# Patient Record
Sex: FEMALE | Race: WHITE | Employment: FULL TIME | URBAN - NONMETROPOLITAN AREA
[De-identification: names, ages, dates, MRNs, and addresses within clinical notes are randomized per-mention and may not be internally consistent; named-entity substitution may affect disease eponyms.]

---

## 2017-02-14 ENCOUNTER — APPOINTMENT (OUTPATIENT)
Dept: CT IMAGING | Age: 47
End: 2017-02-14
Payer: COMMERCIAL

## 2017-02-14 ENCOUNTER — HOSPITAL ENCOUNTER (EMERGENCY)
Age: 47
Discharge: HOME | End: 2017-02-14
Attending: EMERGENCY MEDICINE
Payer: COMMERCIAL

## 2017-02-14 VITALS
SYSTOLIC BLOOD PRESSURE: 123 MMHG | RESPIRATION RATE: 14 BRPM | HEART RATE: 83 BPM | DIASTOLIC BLOOD PRESSURE: 76 MMHG | OXYGEN SATURATION: 95 % | TEMPERATURE: 97.9 F

## 2017-02-14 DIAGNOSIS — R51.9 FACIAL PAIN, ACUTE: Primary | ICD-10-CM

## 2017-02-14 LAB
ABSOLUTE EOS #: 0.2 K/UL (ref 0–0.4)
ABSOLUTE LYMPH #: 3.6 K/UL (ref 1–4.8)
ABSOLUTE MONO #: 0.7 K/UL (ref 0.1–1.2)
ANION GAP SERPL CALCULATED.3IONS-SCNC: 14 MMOL/L (ref 9–17)
BASOPHILS # BLD: 1 % (ref 0–2)
BASOPHILS ABSOLUTE: 0.1 K/UL (ref 0–0.2)
BUN BLDV-MCNC: 7 MG/DL (ref 6–20)
BUN/CREAT BLD: 10 (ref 9–20)
C-REACTIVE PROTEIN: 8.7 MG/L (ref 0–5)
CALCIUM SERPL-MCNC: 9.5 MG/DL (ref 8.6–10.4)
CHLORIDE BLD-SCNC: 102 MMOL/L (ref 98–107)
CO2: 26 MMOL/L (ref 20–31)
CREAT SERPL-MCNC: 0.68 MG/DL (ref 0.5–0.9)
DIFFERENTIAL TYPE: ABNORMAL
EOSINOPHILS RELATIVE PERCENT: 2 % (ref 1–4)
GFR AFRICAN AMERICAN: >60 ML/MIN
GFR NON-AFRICAN AMERICAN: >60 ML/MIN
GFR SERPL CREATININE-BSD FRML MDRD: ABNORMAL ML/MIN/{1.73_M2}
GFR SERPL CREATININE-BSD FRML MDRD: ABNORMAL ML/MIN/{1.73_M2}
GLUCOSE BLD-MCNC: 88 MG/DL (ref 70–99)
HCT VFR BLD CALC: 42.9 % (ref 36–46)
HEMOGLOBIN: 14 G/DL (ref 12–16)
LYMPHOCYTES # BLD: 32 % (ref 24–44)
MCH RBC QN AUTO: 29.4 PG (ref 26–34)
MCHC RBC AUTO-ENTMCNC: 32.7 G/DL (ref 31–37)
MCV RBC AUTO: 89.8 FL (ref 80–100)
MONOCYTES # BLD: 6 % (ref 1–7)
PDW BLD-RTO: 14.4 % (ref 11–14.5)
PLATELET # BLD: 211 K/UL (ref 140–450)
PLATELET ESTIMATE: ABNORMAL
PMV BLD AUTO: 9.8 FL (ref 6–12)
POTASSIUM SERPL-SCNC: 3.6 MMOL/L (ref 3.7–5.3)
RBC # BLD: 4.77 M/UL (ref 4–5.2)
RBC # BLD: ABNORMAL 10*6/UL
SEDIMENTATION RATE, ERYTHROCYTE: 29 MM (ref 0–20)
SEG NEUTROPHILS: 59 % (ref 36–66)
SEGMENTED NEUTROPHILS ABSOLUTE COUNT: 6.7 K/UL (ref 1.8–7.7)
SODIUM BLD-SCNC: 142 MMOL/L (ref 135–144)
WBC # BLD: 11.3 K/UL (ref 3.5–11)
WBC # BLD: ABNORMAL 10*3/UL

## 2017-02-14 PROCEDURE — 85651 RBC SED RATE NONAUTOMATED: CPT

## 2017-02-14 PROCEDURE — 6360000002 HC RX W HCPCS: Performed by: EMERGENCY MEDICINE

## 2017-02-14 PROCEDURE — 36415 COLL VENOUS BLD VENIPUNCTURE: CPT

## 2017-02-14 PROCEDURE — 80048 BASIC METABOLIC PNL TOTAL CA: CPT

## 2017-02-14 PROCEDURE — 85025 COMPLETE CBC W/AUTO DIFF WBC: CPT

## 2017-02-14 PROCEDURE — 70450 CT HEAD/BRAIN W/O DYE: CPT

## 2017-02-14 PROCEDURE — 99283 EMERGENCY DEPT VISIT LOW MDM: CPT

## 2017-02-14 PROCEDURE — 96375 TX/PRO/DX INJ NEW DRUG ADDON: CPT

## 2017-02-14 PROCEDURE — 96374 THER/PROPH/DIAG INJ IV PUSH: CPT

## 2017-02-14 PROCEDURE — 86140 C-REACTIVE PROTEIN: CPT

## 2017-02-14 PROCEDURE — 70450 CT HEAD/BRAIN W/O DYE: CPT | Performed by: RADIOLOGY

## 2017-02-14 RX ORDER — ONDANSETRON 2 MG/ML
4 INJECTION INTRAMUSCULAR; INTRAVENOUS ONCE
Status: COMPLETED | OUTPATIENT
Start: 2017-02-14 | End: 2017-02-14

## 2017-02-14 RX ORDER — FENTANYL CITRATE 50 UG/ML
50 INJECTION, SOLUTION INTRAMUSCULAR; INTRAVENOUS ONCE
Status: COMPLETED | OUTPATIENT
Start: 2017-02-14 | End: 2017-02-14

## 2017-02-14 RX ORDER — KETOROLAC TROMETHAMINE 30 MG/ML
30 INJECTION, SOLUTION INTRAMUSCULAR; INTRAVENOUS ONCE
Status: COMPLETED | OUTPATIENT
Start: 2017-02-14 | End: 2017-02-14

## 2017-02-14 RX ADMIN — KETOROLAC TROMETHAMINE 30 MG: 30 INJECTION, SOLUTION INTRAMUSCULAR at 19:13

## 2017-02-14 RX ADMIN — FENTANYL CITRATE 50 MCG: 50 INJECTION INTRAMUSCULAR; INTRAVENOUS at 19:13

## 2017-02-14 RX ADMIN — ONDANSETRON 4 MG: 2 INJECTION INTRAMUSCULAR; INTRAVENOUS at 19:13

## 2017-02-14 ASSESSMENT — ENCOUNTER SYMPTOMS
NAUSEA: 0
SHORTNESS OF BREATH: 0
CONSTIPATION: 0
SINUS PRESSURE: 1
BLOOD IN STOOL: 0
COUGH: 0
DIARRHEA: 0
ABDOMINAL PAIN: 0
BACK PAIN: 0
VOMITING: 0
EYE PAIN: 0

## 2017-02-14 ASSESSMENT — PAIN DESCRIPTION - ORIENTATION: ORIENTATION: RIGHT

## 2017-02-14 ASSESSMENT — PAIN DESCRIPTION - FREQUENCY: FREQUENCY: CONTINUOUS

## 2017-02-14 ASSESSMENT — PAIN DESCRIPTION - LOCATION: LOCATION: FACE

## 2017-02-14 ASSESSMENT — PAIN DESCRIPTION - PROGRESSION: CLINICAL_PROGRESSION: NOT CHANGED

## 2017-02-14 ASSESSMENT — PAIN SCALES - GENERAL
PAINLEVEL_OUTOF10: 10
PAINLEVEL_OUTOF10: 8
PAINLEVEL_OUTOF10: 10

## 2017-02-14 ASSESSMENT — PAIN DESCRIPTION - PAIN TYPE
TYPE: ACUTE PAIN
TYPE: ACUTE PAIN

## 2017-02-14 ASSESSMENT — PAIN DESCRIPTION - ONSET: ONSET: ON-GOING

## 2017-02-14 ASSESSMENT — PAIN DESCRIPTION - DESCRIPTORS: DESCRIPTORS: ACHING;SHOOTING;SHARP;STABBING

## 2017-07-04 ENCOUNTER — HOSPITAL ENCOUNTER (EMERGENCY)
Age: 47
Discharge: HOME | End: 2017-07-04
Attending: EMERGENCY MEDICINE
Payer: COMMERCIAL

## 2017-07-04 VITALS
HEART RATE: 70 BPM | BODY MASS INDEX: 32.49 KG/M2 | OXYGEN SATURATION: 100 % | HEIGHT: 65 IN | DIASTOLIC BLOOD PRESSURE: 83 MMHG | WEIGHT: 195 LBS | RESPIRATION RATE: 14 BRPM | TEMPERATURE: 97.2 F | SYSTOLIC BLOOD PRESSURE: 148 MMHG

## 2017-07-04 DIAGNOSIS — T78.40XA ALLERGIC REACTION, INITIAL ENCOUNTER: Primary | ICD-10-CM

## 2017-07-04 PROCEDURE — 96372 THER/PROPH/DIAG INJ SC/IM: CPT

## 2017-07-04 PROCEDURE — 99283 EMERGENCY DEPT VISIT LOW MDM: CPT

## 2017-07-04 PROCEDURE — 6360000002 HC RX W HCPCS: Performed by: EMERGENCY MEDICINE

## 2017-07-04 PROCEDURE — 6370000000 HC RX 637 (ALT 250 FOR IP): Performed by: EMERGENCY MEDICINE

## 2017-07-04 RX ORDER — DEXAMETHASONE SODIUM PHOSPHATE 4 MG/ML
4 INJECTION, SOLUTION INTRA-ARTICULAR; INTRALESIONAL; INTRAMUSCULAR; INTRAVENOUS; SOFT TISSUE ONCE
Status: COMPLETED | OUTPATIENT
Start: 2017-07-04 | End: 2017-07-04

## 2017-07-04 RX ORDER — FAMOTIDINE 20 MG/1
20 TABLET, FILM COATED ORAL ONCE
Status: COMPLETED | OUTPATIENT
Start: 2017-07-04 | End: 2017-07-04

## 2017-07-04 RX ADMIN — FAMOTIDINE 20 MG: 20 TABLET ORAL at 02:14

## 2017-07-04 RX ADMIN — DEXAMETHASONE SODIUM PHOSPHATE 4 MG: 4 INJECTION, SOLUTION INTRAMUSCULAR; INTRAVENOUS at 02:14

## 2017-07-04 ASSESSMENT — PAIN DESCRIPTION - FREQUENCY: FREQUENCY: CONTINUOUS

## 2017-07-04 ASSESSMENT — PAIN DESCRIPTION - DESCRIPTORS: DESCRIPTORS: STABBING

## 2017-07-04 ASSESSMENT — PAIN DESCRIPTION - LOCATION: LOCATION: ARM

## 2017-07-04 ASSESSMENT — PAIN DESCRIPTION - PAIN TYPE: TYPE: ACUTE PAIN

## 2017-07-04 ASSESSMENT — PAIN DESCRIPTION - ORIENTATION: ORIENTATION: LEFT

## 2017-07-04 ASSESSMENT — PAIN SCALES - GENERAL: PAINLEVEL_OUTOF10: 8

## 2017-09-06 ENCOUNTER — OFFICE VISIT (OUTPATIENT)
Dept: PRIMARY CARE CLINIC | Age: 47
End: 2017-09-06
Payer: COMMERCIAL

## 2017-09-06 VITALS
SYSTOLIC BLOOD PRESSURE: 124 MMHG | WEIGHT: 179 LBS | BODY MASS INDEX: 29.82 KG/M2 | HEIGHT: 65 IN | TEMPERATURE: 97.5 F | DIASTOLIC BLOOD PRESSURE: 70 MMHG | HEART RATE: 62 BPM | OXYGEN SATURATION: 97 %

## 2017-09-06 DIAGNOSIS — R51.9 ACUTE NONINTRACTABLE HEADACHE, UNSPECIFIED HEADACHE TYPE: ICD-10-CM

## 2017-09-06 DIAGNOSIS — R11.2 NON-INTRACTABLE VOMITING WITH NAUSEA, UNSPECIFIED VOMITING TYPE: Primary | ICD-10-CM

## 2017-09-06 PROCEDURE — 99214 OFFICE O/P EST MOD 30 MIN: CPT | Performed by: FAMILY MEDICINE

## 2017-09-06 RX ORDER — PROMETHAZINE HYDROCHLORIDE 25 MG/1
25 TABLET ORAL EVERY 6 HOURS PRN
Qty: 15 TABLET | Refills: 0 | Status: SHIPPED | OUTPATIENT
Start: 2017-09-06 | End: 2017-09-13

## 2017-09-06 RX ORDER — MELOXICAM 15 MG/1
15 TABLET ORAL DAILY
Qty: 15 TABLET | Refills: 0 | Status: SHIPPED | OUTPATIENT
Start: 2017-09-06 | End: 2019-05-02

## 2017-10-23 ENCOUNTER — TELEPHONE (OUTPATIENT)
Dept: PRIMARY CARE CLINIC | Age: 47
End: 2017-10-23

## 2017-12-07 ENCOUNTER — TELEPHONE (OUTPATIENT)
Dept: PRIMARY CARE CLINIC | Age: 47
End: 2017-12-07

## 2017-12-07 NOTE — LETTER
[facility]  Westley Jamil  Phone: [phone]  Fax: [phone]    Stefan Ya MD        December 7, 2017     Joy Stanford  [address] [place] [address]      Dear Mitali Peñaloza: This letter is a reminder that your Basic Metabolic Profile and CBC tests are due. Please call our office to schedule an appointment. If you've already underwent or scheduled these procedures, please disregard this notice.     Sincerely,        Stfean Ya MD

## 2018-01-15 ENCOUNTER — TELEPHONE (OUTPATIENT)
Dept: PRIMARY CARE CLINIC | Age: 48
End: 2018-01-15

## 2018-04-08 ENCOUNTER — HOSPITAL ENCOUNTER (EMERGENCY)
Age: 48
Discharge: HOME | End: 2018-04-08
Attending: EMERGENCY MEDICINE
Payer: COMMERCIAL

## 2018-04-08 VITALS
HEART RATE: 68 BPM | RESPIRATION RATE: 12 BRPM | DIASTOLIC BLOOD PRESSURE: 81 MMHG | SYSTOLIC BLOOD PRESSURE: 144 MMHG | TEMPERATURE: 97.2 F | OXYGEN SATURATION: 98 %

## 2018-04-08 DIAGNOSIS — L50.9 URTICARIA: Primary | ICD-10-CM

## 2018-04-08 PROCEDURE — 96374 THER/PROPH/DIAG INJ IV PUSH: CPT

## 2018-04-08 PROCEDURE — 2500000003 HC RX 250 WO HCPCS: Performed by: EMERGENCY MEDICINE

## 2018-04-08 PROCEDURE — S0028 INJECTION, FAMOTIDINE, 20 MG: HCPCS | Performed by: EMERGENCY MEDICINE

## 2018-04-08 PROCEDURE — 6360000002 HC RX W HCPCS: Performed by: EMERGENCY MEDICINE

## 2018-04-08 PROCEDURE — 96375 TX/PRO/DX INJ NEW DRUG ADDON: CPT

## 2018-04-08 PROCEDURE — 99282 EMERGENCY DEPT VISIT SF MDM: CPT

## 2018-04-08 RX ORDER — FAMOTIDINE 20 MG/1
20 TABLET, FILM COATED ORAL 2 TIMES DAILY
Qty: 10 TABLET | Refills: 0 | Status: SHIPPED | OUTPATIENT
Start: 2018-04-08 | End: 2019-05-02

## 2018-04-08 RX ORDER — METHYLPREDNISOLONE SODIUM SUCCINATE 125 MG/2ML
125 INJECTION, POWDER, LYOPHILIZED, FOR SOLUTION INTRAMUSCULAR; INTRAVENOUS ONCE
Status: COMPLETED | OUTPATIENT
Start: 2018-04-08 | End: 2018-04-08

## 2018-04-08 RX ORDER — PREDNISONE 50 MG/1
50 TABLET ORAL DAILY
Qty: 5 TABLET | Refills: 0 | Status: SHIPPED | OUTPATIENT
Start: 2018-04-08 | End: 2020-03-11

## 2018-04-08 RX ORDER — LORATADINE 10 MG/1
10 TABLET ORAL DAILY
Qty: 30 TABLET | Refills: 0 | Status: SHIPPED | OUTPATIENT
Start: 2018-04-08 | End: 2019-05-02

## 2018-04-08 RX ADMIN — FAMOTIDINE 20 MG: 10 INJECTION, SOLUTION INTRAVENOUS at 14:34

## 2018-04-08 RX ADMIN — METHYLPREDNISOLONE SODIUM SUCCINATE 125 MG: 125 INJECTION, POWDER, FOR SOLUTION INTRAMUSCULAR; INTRAVENOUS at 14:31

## 2018-04-08 ASSESSMENT — PAIN SCALES - GENERAL: PAINLEVEL_OUTOF10: 4

## 2018-04-08 ASSESSMENT — PAIN DESCRIPTION - PAIN TYPE: TYPE: ACUTE PAIN

## 2018-04-08 ASSESSMENT — PAIN DESCRIPTION - PROGRESSION: CLINICAL_PROGRESSION: GRADUALLY WORSENING

## 2018-04-08 ASSESSMENT — PAIN DESCRIPTION - LOCATION: LOCATION: ARM

## 2018-04-08 ASSESSMENT — PAIN SCALES - WONG BAKER: WONGBAKER_NUMERICALRESPONSE: 0

## 2018-04-08 ASSESSMENT — PAIN DESCRIPTION - ONSET: ONSET: ON-GOING

## 2018-04-08 ASSESSMENT — PAIN DESCRIPTION - FREQUENCY: FREQUENCY: CONTINUOUS

## 2018-04-08 ASSESSMENT — PAIN DESCRIPTION - ORIENTATION: ORIENTATION: LEFT

## 2018-04-15 ENCOUNTER — APPOINTMENT (OUTPATIENT)
Dept: CT IMAGING | Age: 48
End: 2018-04-15
Payer: COMMERCIAL

## 2018-04-15 ENCOUNTER — HOSPITAL ENCOUNTER (EMERGENCY)
Age: 48
Discharge: HOME | End: 2018-04-15
Attending: EMERGENCY MEDICINE
Payer: COMMERCIAL

## 2018-04-15 VITALS
DIASTOLIC BLOOD PRESSURE: 79 MMHG | HEART RATE: 68 BPM | TEMPERATURE: 99.1 F | SYSTOLIC BLOOD PRESSURE: 140 MMHG | RESPIRATION RATE: 12 BRPM | OXYGEN SATURATION: 98 %

## 2018-04-15 DIAGNOSIS — N10 ACUTE PYELONEPHRITIS: Primary | ICD-10-CM

## 2018-04-15 LAB
-: ABNORMAL
ABSOLUTE EOS #: 0.8 K/UL (ref 0–0.4)
ABSOLUTE IMMATURE GRANULOCYTE: ABNORMAL K/UL (ref 0–0.3)
ABSOLUTE LYMPH #: 6.36 K/UL (ref 1–4.8)
ABSOLUTE MONO #: 1.75 K/UL (ref 0.1–1.2)
ALBUMIN SERPL-MCNC: 3.7 G/DL (ref 3.5–5.2)
ALBUMIN/GLOBULIN RATIO: 1.2 (ref 1–2.5)
ALP BLD-CCNC: 91 U/L (ref 35–104)
ALT SERPL-CCNC: 18 U/L (ref 5–33)
AMORPHOUS: ABNORMAL
AMYLASE: 41 U/L (ref 28–100)
ANION GAP SERPL CALCULATED.3IONS-SCNC: 13 MMOL/L (ref 9–17)
AST SERPL-CCNC: 10 U/L
BACTERIA: ABNORMAL
BASOPHILS # BLD: 0 % (ref 0–1)
BASOPHILS ABSOLUTE: 0 K/UL (ref 0–0.2)
BILIRUB SERPL-MCNC: 0.2 MG/DL (ref 0.3–1.2)
BILIRUBIN DIRECT: <0.08 MG/DL
BILIRUBIN URINE: NEGATIVE
BILIRUBIN, INDIRECT: ABNORMAL MG/DL (ref 0–1)
BUN BLDV-MCNC: 7 MG/DL (ref 6–20)
BUN/CREAT BLD: 12 (ref 9–20)
CALCIUM SERPL-MCNC: 8.9 MG/DL (ref 8.6–10.4)
CASTS UA: ABNORMAL /LPF (ref 0–2)
CHLORIDE BLD-SCNC: 102 MMOL/L (ref 98–107)
CO2: 26 MMOL/L (ref 20–31)
COLOR: ABNORMAL
COMMENT UA: ABNORMAL
CREAT SERPL-MCNC: 0.59 MG/DL (ref 0.5–0.9)
CRYSTALS, UA: ABNORMAL /HPF
DIFFERENTIAL TYPE: ABNORMAL
EOSINOPHILS RELATIVE PERCENT: 5 % (ref 1–7)
EPITHELIAL CELLS UA: ABNORMAL /HPF (ref 0–5)
GFR AFRICAN AMERICAN: >60 ML/MIN
GFR NON-AFRICAN AMERICAN: >60 ML/MIN
GFR SERPL CREATININE-BSD FRML MDRD: ABNORMAL ML/MIN/{1.73_M2}
GFR SERPL CREATININE-BSD FRML MDRD: ABNORMAL ML/MIN/{1.73_M2}
GLOBULIN: 3 G/DL (ref 1.5–3.8)
GLUCOSE BLD-MCNC: 91 MG/DL (ref 70–99)
GLUCOSE URINE: NEGATIVE
HCT VFR BLD CALC: 44.5 % (ref 36–46)
HEMOGLOBIN: 14.8 G/DL (ref 12–16)
IMMATURE GRANULOCYTES: ABNORMAL %
KETONES, URINE: NEGATIVE
LEUKOCYTE ESTERASE, URINE: ABNORMAL
LIPASE: 17 U/L (ref 13–60)
LYMPHOCYTES # BLD: 40 % (ref 16–46)
MCH RBC QN AUTO: 29.1 PG (ref 26–34)
MCHC RBC AUTO-ENTMCNC: 33.2 G/DL (ref 31–37)
MCV RBC AUTO: 87.8 FL (ref 80–100)
MONOCYTES # BLD: 11 % (ref 4–11)
MORPHOLOGY: ABNORMAL
MUCUS: ABNORMAL
NITRITE, URINE: NEGATIVE
NRBC AUTOMATED: ABNORMAL PER 100 WBC
OTHER OBSERVATIONS UA: ABNORMAL
PDW BLD-RTO: 14.8 % (ref 11–14.5)
PH UA: 7 (ref 5–6)
PLATELET # BLD: 236 K/UL (ref 140–450)
PLATELET ESTIMATE: ABNORMAL
PMV BLD AUTO: 9.4 FL (ref 6–12)
POTASSIUM SERPL-SCNC: 3.3 MMOL/L (ref 3.7–5.3)
PROTEIN UA: NEGATIVE
RBC # BLD: 5.07 M/UL (ref 4–5.2)
RBC # BLD: ABNORMAL 10*6/UL
RBC UA: ABNORMAL /HPF (ref 0–4)
RENAL EPITHELIAL, UA: ABNORMAL /HPF
SEG NEUTROPHILS: 44 % (ref 43–77)
SEGMENTED NEUTROPHILS ABSOLUTE COUNT: 6.99 K/UL (ref 1.8–7.7)
SODIUM BLD-SCNC: 141 MMOL/L (ref 135–144)
SPECIFIC GRAVITY UA: 1 (ref 1.01–1.02)
TOTAL PROTEIN: 6.7 G/DL (ref 6.4–8.3)
TRICHOMONAS: ABNORMAL
TURBIDITY: ABNORMAL
URINE HGB: ABNORMAL
UROBILINOGEN, URINE: NORMAL
WBC # BLD: 15.9 K/UL (ref 3.5–11)
WBC # BLD: ABNORMAL 10*3/UL
WBC UA: ABNORMAL /HPF (ref 0–4)
YEAST: ABNORMAL

## 2018-04-15 PROCEDURE — 96374 THER/PROPH/DIAG INJ IV PUSH: CPT

## 2018-04-15 PROCEDURE — 6360000002 HC RX W HCPCS: Performed by: EMERGENCY MEDICINE

## 2018-04-15 PROCEDURE — 85025 COMPLETE CBC W/AUTO DIFF WBC: CPT

## 2018-04-15 PROCEDURE — 82150 ASSAY OF AMYLASE: CPT

## 2018-04-15 PROCEDURE — 99284 EMERGENCY DEPT VISIT MOD MDM: CPT

## 2018-04-15 PROCEDURE — 2580000003 HC RX 258: Performed by: EMERGENCY MEDICINE

## 2018-04-15 PROCEDURE — 87086 URINE CULTURE/COLONY COUNT: CPT

## 2018-04-15 PROCEDURE — 96375 TX/PRO/DX INJ NEW DRUG ADDON: CPT

## 2018-04-15 PROCEDURE — 80048 BASIC METABOLIC PNL TOTAL CA: CPT

## 2018-04-15 PROCEDURE — 74176 CT ABD & PELVIS W/O CONTRAST: CPT

## 2018-04-15 PROCEDURE — 80076 HEPATIC FUNCTION PANEL: CPT

## 2018-04-15 PROCEDURE — 83690 ASSAY OF LIPASE: CPT

## 2018-04-15 PROCEDURE — 81001 URINALYSIS AUTO W/SCOPE: CPT

## 2018-04-15 RX ORDER — IBUPROFEN 800 MG/1
800 TABLET ORAL EVERY 8 HOURS PRN
Qty: 30 TABLET | Refills: 0 | Status: SHIPPED | OUTPATIENT
Start: 2018-04-15 | End: 2019-05-02

## 2018-04-15 RX ORDER — 0.9 % SODIUM CHLORIDE 0.9 %
1000 INTRAVENOUS SOLUTION INTRAVENOUS ONCE
Status: COMPLETED | OUTPATIENT
Start: 2018-04-15 | End: 2018-04-15

## 2018-04-15 RX ORDER — KETOROLAC TROMETHAMINE 30 MG/ML
30 INJECTION, SOLUTION INTRAMUSCULAR; INTRAVENOUS ONCE
Status: COMPLETED | OUTPATIENT
Start: 2018-04-15 | End: 2018-04-15

## 2018-04-15 RX ORDER — CEPHALEXIN 500 MG/1
500 CAPSULE ORAL 3 TIMES DAILY
Qty: 30 CAPSULE | Refills: 0 | Status: SHIPPED | OUTPATIENT
Start: 2018-04-15 | End: 2018-04-25

## 2018-04-15 RX ORDER — ONDANSETRON 2 MG/ML
4 INJECTION INTRAMUSCULAR; INTRAVENOUS ONCE
Status: COMPLETED | OUTPATIENT
Start: 2018-04-15 | End: 2018-04-15

## 2018-04-15 RX ADMIN — SODIUM CHLORIDE 1000 ML: 9 INJECTION, SOLUTION INTRAVENOUS at 15:57

## 2018-04-15 RX ADMIN — KETOROLAC TROMETHAMINE 30 MG: 30 INJECTION, SOLUTION INTRAMUSCULAR at 15:57

## 2018-04-15 RX ADMIN — ONDANSETRON 4 MG: 2 INJECTION INTRAMUSCULAR; INTRAVENOUS at 15:57

## 2018-04-15 RX ADMIN — SODIUM CHLORIDE 1 G: 9 INJECTION INTRAMUSCULAR; INTRAVENOUS; SUBCUTANEOUS at 17:03

## 2018-04-15 ASSESSMENT — ENCOUNTER SYMPTOMS
CONSTIPATION: 0
BLOOD IN STOOL: 0
SHORTNESS OF BREATH: 0
ABDOMINAL PAIN: 0
DIARRHEA: 0
VOMITING: 0
BACK PAIN: 1
EYE PAIN: 0
NAUSEA: 1
COUGH: 0

## 2018-04-15 ASSESSMENT — PAIN SCALES - GENERAL
PAINLEVEL_OUTOF10: 10
PAINLEVEL_OUTOF10: 8
PAINLEVEL_OUTOF10: 6
PAINLEVEL_OUTOF10: 10

## 2018-04-15 ASSESSMENT — PAIN DESCRIPTION - DIRECTION: RADIATING_TOWARDS: RLQ

## 2018-04-15 ASSESSMENT — PAIN DESCRIPTION - LOCATION: LOCATION: FLANK

## 2018-04-15 ASSESSMENT — PAIN DESCRIPTION - DESCRIPTORS: DESCRIPTORS: SHARP;SHOOTING;CRAMPING

## 2018-04-15 ASSESSMENT — PAIN DESCRIPTION - PAIN TYPE: TYPE: ACUTE PAIN

## 2018-04-15 ASSESSMENT — PAIN DESCRIPTION - ORIENTATION: ORIENTATION: RIGHT

## 2018-04-17 LAB
CULTURE: NORMAL
CULTURE: NORMAL
Lab: NORMAL
SPECIMEN DESCRIPTION: NORMAL
SPECIMEN DESCRIPTION: NORMAL
STATUS: NORMAL

## 2018-05-14 ENCOUNTER — HOSPITAL ENCOUNTER (OUTPATIENT)
Dept: GENERAL RADIOLOGY | Age: 48
Discharge: HOME | End: 2018-05-16
Payer: COMMERCIAL

## 2018-05-14 ENCOUNTER — HOSPITAL ENCOUNTER (OUTPATIENT)
Dept: LAB | Age: 48
Setting detail: SPECIMEN
Discharge: HOME | End: 2018-05-14
Payer: COMMERCIAL

## 2018-05-14 DIAGNOSIS — J84.9 INTERSTITIAL LUNG DISEASE (HCC): ICD-10-CM

## 2018-05-14 LAB
-: ABNORMAL
AMORPHOUS: ABNORMAL
ANION GAP SERPL CALCULATED.3IONS-SCNC: 13 MMOL/L (ref 9–17)
BACTERIA: ABNORMAL
BILIRUBIN URINE: NEGATIVE
BUN BLDV-MCNC: 10 MG/DL (ref 6–20)
BUN/CREAT BLD: 18 (ref 9–20)
C-REACTIVE PROTEIN: 17.5 MG/L (ref 0–5)
CALCIUM SERPL-MCNC: 9.6 MG/DL (ref 8.6–10.4)
CASTS UA: ABNORMAL /LPF (ref 0–2)
CHLORIDE BLD-SCNC: 101 MMOL/L (ref 98–107)
CO2: 27 MMOL/L (ref 20–31)
COLOR: ABNORMAL
COMMENT UA: ABNORMAL
COMPLEMENT C3: 136 MG/DL (ref 90–180)
COMPLEMENT C4: 24 MG/DL (ref 10–40)
CREAT SERPL-MCNC: 0.57 MG/DL (ref 0.5–0.9)
CREATININE URINE: 203.3 MG/DL (ref 28–217)
CRYSTALS, UA: ABNORMAL /HPF
DAT, POLYSPECIFIC: NEGATIVE
EPITHELIAL CELLS UA: ABNORMAL /HPF (ref 0–5)
GFR AFRICAN AMERICAN: >60 ML/MIN
GFR NON-AFRICAN AMERICAN: >60 ML/MIN
GFR SERPL CREATININE-BSD FRML MDRD: NORMAL ML/MIN/{1.73_M2}
GFR SERPL CREATININE-BSD FRML MDRD: NORMAL ML/MIN/{1.73_M2}
GLUCOSE BLD-MCNC: 76 MG/DL (ref 70–99)
GLUCOSE URINE: NEGATIVE
HBV SURFACE AB TITR SER: <3.5 MIU/ML
HEPATITIS B CORE TOTAL ANTIBODY: NONREACTIVE
HEPATITIS B SURFACE ANTIGEN: NONREACTIVE
HEPATITIS C ANTIBODY: NONREACTIVE
KETONES, URINE: NEGATIVE
LEUKOCYTE ESTERASE, URINE: ABNORMAL
MUCUS: ABNORMAL
NITRITE, URINE: NEGATIVE
OTHER OBSERVATIONS UA: ABNORMAL
PH UA: 6 (ref 5–6)
POTASSIUM SERPL-SCNC: 3.7 MMOL/L (ref 3.7–5.3)
PROTEIN UA: NEGATIVE
RBC UA: ABNORMAL /HPF (ref 0–4)
RENAL EPITHELIAL, UA: ABNORMAL /HPF
RHEUMATOID FACTOR: <10 IU/ML
SEDIMENTATION RATE, ERYTHROCYTE: 17 MM (ref 0–20)
SODIUM BLD-SCNC: 141 MMOL/L (ref 135–144)
SPECIFIC GRAVITY UA: 1.02 (ref 1.01–1.02)
T3 FREE: 3.69 PG/ML (ref 2.02–4.43)
THYROXINE, FREE: 0.9 NG/DL (ref 0.93–1.7)
TOTAL PROTEIN, URINE: 60 MG/DL
TRICHOMONAS: ABNORMAL
TSH SERPL DL<=0.05 MIU/L-ACNC: 2.95 MIU/L (ref 0.3–5)
TURBIDITY: ABNORMAL
URINE HGB: ABNORMAL
UROBILINOGEN, URINE: NORMAL
WBC UA: ABNORMAL /HPF (ref 0–4)
YEAST: ABNORMAL

## 2018-05-14 PROCEDURE — 81001 URINALYSIS AUTO W/SCOPE: CPT

## 2018-05-14 PROCEDURE — 86200 CCP ANTIBODY: CPT

## 2018-05-14 PROCEDURE — 84439 ASSAY OF FREE THYROXINE: CPT

## 2018-05-14 PROCEDURE — 86317 IMMUNOASSAY INFECTIOUS AGENT: CPT

## 2018-05-14 PROCEDURE — 85730 THROMBOPLASTIN TIME PARTIAL: CPT

## 2018-05-14 PROCEDURE — 83516 IMMUNOASSAY NONANTIBODY: CPT

## 2018-05-14 PROCEDURE — 86140 C-REACTIVE PROTEIN: CPT

## 2018-05-14 PROCEDURE — 80048 BASIC METABOLIC PNL TOTAL CA: CPT

## 2018-05-14 PROCEDURE — 36415 COLL VENOUS BLD VENIPUNCTURE: CPT

## 2018-05-14 PROCEDURE — 82570 ASSAY OF URINE CREATININE: CPT

## 2018-05-14 PROCEDURE — 85613 RUSSELL VIPER VENOM DILUTED: CPT

## 2018-05-14 PROCEDURE — 84481 FREE ASSAY (FT-3): CPT

## 2018-05-14 PROCEDURE — 86146 BETA-2 GLYCOPROTEIN ANTIBODY: CPT

## 2018-05-14 PROCEDURE — 86147 CARDIOLIPIN ANTIBODY EA IG: CPT

## 2018-05-14 PROCEDURE — 85610 PROTHROMBIN TIME: CPT

## 2018-05-14 PROCEDURE — 86704 HEP B CORE ANTIBODY TOTAL: CPT

## 2018-05-14 PROCEDURE — 86160 COMPLEMENT ANTIGEN: CPT

## 2018-05-14 PROCEDURE — 84443 ASSAY THYROID STIM HORMONE: CPT

## 2018-05-14 PROCEDURE — 87340 HEPATITIS B SURFACE AG IA: CPT

## 2018-05-14 PROCEDURE — 71046 X-RAY EXAM CHEST 2 VIEWS: CPT

## 2018-05-14 PROCEDURE — 84156 ASSAY OF PROTEIN URINE: CPT

## 2018-05-14 PROCEDURE — 86880 COOMBS TEST DIRECT: CPT

## 2018-05-14 PROCEDURE — 86803 HEPATITIS C AB TEST: CPT

## 2018-05-14 PROCEDURE — 86431 RHEUMATOID FACTOR QUANT: CPT

## 2018-05-14 PROCEDURE — 86235 NUCLEAR ANTIGEN ANTIBODY: CPT

## 2018-05-14 PROCEDURE — 85651 RBC SED RATE NONAUTOMATED: CPT

## 2018-05-15 LAB
ANTI-SCLERODERMA: 119 U/ML
CCP IGG ANTIBODIES: <1.5 U/ML

## 2018-05-16 ENCOUNTER — HOSPITAL ENCOUNTER (OUTPATIENT)
Dept: PULMONOLOGY | Age: 48
Discharge: HOME | End: 2018-05-16
Payer: COMMERCIAL

## 2018-05-16 LAB
ANTI B2-GLYCOPROTEIN IGG: 1 SGU (ref 0–20)
ANTI B2-GLYCOPROTEIN IGM: 20 SMU (ref 0–20)

## 2018-05-16 PROCEDURE — 6360000002 HC RX W HCPCS: Performed by: INTERNAL MEDICINE

## 2018-05-16 PROCEDURE — 94726 PLETHYSMOGRAPHY LUNG VOLUMES: CPT

## 2018-05-16 PROCEDURE — 94060 EVALUATION OF WHEEZING: CPT

## 2018-05-16 PROCEDURE — 94640 AIRWAY INHALATION TREATMENT: CPT

## 2018-05-16 PROCEDURE — 94729 DIFFUSING CAPACITY: CPT

## 2018-05-16 RX ORDER — ALBUTEROL SULFATE 2.5 MG/3ML
2.5 SOLUTION RESPIRATORY (INHALATION) ONCE
Status: COMPLETED | OUTPATIENT
Start: 2018-05-16 | End: 2018-05-16

## 2018-05-16 RX ADMIN — ALBUTEROL SULFATE 2.5 MG: 2.5 SOLUTION RESPIRATORY (INHALATION) at 08:07

## 2018-05-17 LAB
ANTICARDIOLIPIN IGA ANTIBODY: 6.5 APU
ANTICARDIOLIPIN IGG ANTIBODY: 25.8 GPU
CARDIOLIPIN AB IGM: 47.9 MPU
DILUTE RUSSELL VIPER VENOM TIME: ABNORMAL
INR BLD: 0.9
LUPUS ANTICOAG: ABNORMAL
PARTIAL THROMBOPLASTIN TIME: 26.7 SEC (ref 20.5–30.5)
PROTHROMBIN TIME: 9.7 SEC (ref 9–12)

## 2018-05-18 ENCOUNTER — HOSPITAL ENCOUNTER (OUTPATIENT)
Dept: NON INVASIVE DIAGNOSTICS | Age: 48
Discharge: HOME | End: 2018-05-18
Payer: COMMERCIAL

## 2018-05-18 LAB
LV EF: 65 %
LVEF MODALITY: NORMAL
SEND OUT REPORT: NORMAL
TEST NAME: NORMAL

## 2018-05-18 PROCEDURE — 93306 TTE W/DOPPLER COMPLETE: CPT

## 2018-06-19 ENCOUNTER — HOSPITAL ENCOUNTER (OUTPATIENT)
Dept: LAB | Age: 48
Setting detail: SPECIMEN
Discharge: HOME | End: 2018-06-19
Payer: COMMERCIAL

## 2018-06-19 LAB
-: ABNORMAL
AMORPHOUS: ABNORMAL
BACTERIA: ABNORMAL
BILIRUBIN URINE: ABNORMAL
CASTS UA: ABNORMAL /LPF (ref 0–2)
COLOR: ABNORMAL
COMMENT UA: ABNORMAL
CRYSTALS, UA: ABNORMAL /HPF
EPITHELIAL CELLS UA: ABNORMAL /HPF (ref 0–5)
GLUCOSE URINE: NEGATIVE
KETONES, URINE: NEGATIVE
LEUKOCYTE ESTERASE, URINE: ABNORMAL
MUCUS: ABNORMAL
NITRITE, URINE: NEGATIVE
OTHER OBSERVATIONS UA: ABNORMAL
PH UA: 6 (ref 5–6)
PROTEIN UA: NEGATIVE
RBC UA: ABNORMAL /HPF (ref 0–4)
RENAL EPITHELIAL, UA: ABNORMAL /HPF
SPECIFIC GRAVITY UA: 1.02 (ref 1.01–1.02)
TRICHOMONAS: ABNORMAL
TURBIDITY: ABNORMAL
URINE HGB: ABNORMAL
UROBILINOGEN, URINE: NORMAL
WBC UA: ABNORMAL /HPF (ref 0–4)
YEAST: ABNORMAL

## 2018-06-19 PROCEDURE — 36415 COLL VENOUS BLD VENIPUNCTURE: CPT

## 2018-06-19 PROCEDURE — 87086 URINE CULTURE/COLONY COUNT: CPT

## 2018-06-19 PROCEDURE — 86403 PARTICLE AGGLUT ANTBDY SCRN: CPT

## 2018-06-19 PROCEDURE — 81001 URINALYSIS AUTO W/SCOPE: CPT

## 2018-06-20 LAB
CULTURE: NORMAL
Lab: NORMAL
SPECIMEN DESCRIPTION: NORMAL
STATUS: NORMAL

## 2018-06-24 ENCOUNTER — HOSPITAL ENCOUNTER (EMERGENCY)
Age: 48
Discharge: HOME | End: 2018-06-24
Attending: EMERGENCY MEDICINE
Payer: COMMERCIAL

## 2018-06-24 VITALS
SYSTOLIC BLOOD PRESSURE: 154 MMHG | OXYGEN SATURATION: 98 % | HEART RATE: 89 BPM | TEMPERATURE: 98 F | DIASTOLIC BLOOD PRESSURE: 105 MMHG | RESPIRATION RATE: 12 BRPM

## 2018-06-24 DIAGNOSIS — L50.9 URTICARIA: Primary | ICD-10-CM

## 2018-06-24 PROCEDURE — 6360000002 HC RX W HCPCS: Performed by: EMERGENCY MEDICINE

## 2018-06-24 PROCEDURE — 99282 EMERGENCY DEPT VISIT SF MDM: CPT

## 2018-06-24 PROCEDURE — 96372 THER/PROPH/DIAG INJ SC/IM: CPT

## 2018-06-24 RX ORDER — LORATADINE 10 MG/1
10 TABLET ORAL DAILY
Qty: 30 TABLET | Refills: 0 | Status: SHIPPED | OUTPATIENT
Start: 2018-06-24 | End: 2019-05-02

## 2018-06-24 RX ORDER — PREDNISONE 10 MG/1
TABLET ORAL
Qty: 20 TABLET | Refills: 0 | Status: SHIPPED | OUTPATIENT
Start: 2018-06-24 | End: 2020-03-11

## 2018-06-24 RX ORDER — DEXAMETHASONE SODIUM PHOSPHATE 10 MG/ML
4 INJECTION INTRAMUSCULAR; INTRAVENOUS ONCE
Status: COMPLETED | OUTPATIENT
Start: 2018-06-24 | End: 2018-06-24

## 2018-06-24 RX ADMIN — DEXAMETHASONE SODIUM PHOSPHATE 4 MG: 10 INJECTION INTRAMUSCULAR; INTRAVENOUS at 12:50

## 2019-01-22 ENCOUNTER — APPOINTMENT (OUTPATIENT)
Dept: GENERAL RADIOLOGY | Age: 49
End: 2019-01-22
Payer: COMMERCIAL

## 2019-01-22 ENCOUNTER — HOSPITAL ENCOUNTER (EMERGENCY)
Age: 49
Discharge: HOME | End: 2019-01-22
Attending: EMERGENCY MEDICINE
Payer: COMMERCIAL

## 2019-01-22 VITALS
SYSTOLIC BLOOD PRESSURE: 155 MMHG | HEART RATE: 85 BPM | DIASTOLIC BLOOD PRESSURE: 75 MMHG | RESPIRATION RATE: 12 BRPM | OXYGEN SATURATION: 96 % | TEMPERATURE: 98.4 F

## 2019-01-22 DIAGNOSIS — S62.346A NONDISPLACED FRACTURE OF BASE OF FIFTH METACARPAL BONE, RIGHT HAND, INITIAL ENCOUNTER FOR CLOSED FRACTURE: Primary | ICD-10-CM

## 2019-01-22 PROCEDURE — 99283 EMERGENCY DEPT VISIT LOW MDM: CPT

## 2019-01-22 PROCEDURE — 73110 X-RAY EXAM OF WRIST: CPT

## 2019-01-22 PROCEDURE — 6370000000 HC RX 637 (ALT 250 FOR IP): Performed by: EMERGENCY MEDICINE

## 2019-01-22 PROCEDURE — 73130 X-RAY EXAM OF HAND: CPT

## 2019-01-22 PROCEDURE — 29125 APPL SHORT ARM SPLINT STATIC: CPT

## 2019-01-22 RX ORDER — IBUPROFEN 800 MG/1
800 TABLET ORAL ONCE
Status: COMPLETED | OUTPATIENT
Start: 2019-01-22 | End: 2019-01-22

## 2019-01-22 RX ORDER — HYDROCODONE BITARTRATE AND ACETAMINOPHEN 5; 325 MG/1; MG/1
1 TABLET ORAL EVERY 6 HOURS PRN
Qty: 20 TABLET | Refills: 0 | Status: SHIPPED | OUTPATIENT
Start: 2019-01-22 | End: 2019-01-25

## 2019-01-22 RX ORDER — IBUPROFEN 800 MG/1
800 TABLET ORAL EVERY 8 HOURS PRN
Qty: 30 TABLET | Refills: 0 | Status: SHIPPED | OUTPATIENT
Start: 2019-01-22 | End: 2019-05-02

## 2019-01-22 RX ADMIN — IBUPROFEN 800 MG: 800 TABLET, FILM COATED ORAL at 11:17

## 2019-01-22 ASSESSMENT — ENCOUNTER SYMPTOMS
DIARRHEA: 0
CONSTIPATION: 0
SHORTNESS OF BREATH: 0
ABDOMINAL PAIN: 0
COUGH: 0
VOMITING: 0
EYE PAIN: 0
BACK PAIN: 0
NAUSEA: 0
BLOOD IN STOOL: 0

## 2019-01-22 ASSESSMENT — PAIN DESCRIPTION - LOCATION: LOCATION: ARM;WRIST;HAND

## 2019-01-22 ASSESSMENT — PAIN DESCRIPTION - PAIN TYPE: TYPE: ACUTE PAIN

## 2019-01-22 ASSESSMENT — PAIN SCALES - GENERAL
PAINLEVEL_OUTOF10: 8

## 2019-01-22 ASSESSMENT — PAIN DESCRIPTION - ORIENTATION: ORIENTATION: LOWER

## 2019-01-22 ASSESSMENT — PAIN DESCRIPTION - FREQUENCY: FREQUENCY: CONTINUOUS

## 2019-01-22 ASSESSMENT — PAIN DESCRIPTION - DESCRIPTORS: DESCRIPTORS: THROBBING

## 2019-01-23 ENCOUNTER — OFFICE VISIT (OUTPATIENT)
Dept: ORTHOPEDIC SURGERY | Age: 49
End: 2019-01-23
Payer: COMMERCIAL

## 2019-01-23 VITALS
WEIGHT: 179 LBS | HEART RATE: 84 BPM | DIASTOLIC BLOOD PRESSURE: 84 MMHG | BODY MASS INDEX: 29.82 KG/M2 | HEIGHT: 65 IN | SYSTOLIC BLOOD PRESSURE: 124 MMHG

## 2019-01-23 DIAGNOSIS — S62.316A CLOSED FRACTURE OF BASE OF FIFTH METACARPAL BONE OF RIGHT HAND, INITIAL ENCOUNTER: Primary | ICD-10-CM

## 2019-01-23 PROCEDURE — 99203 OFFICE O/P NEW LOW 30 MIN: CPT | Performed by: FAMILY MEDICINE

## 2019-01-23 PROCEDURE — L3807 WHFO W/O JOINTS PRE CST: HCPCS | Performed by: FAMILY MEDICINE

## 2019-02-07 DIAGNOSIS — S62.91XS CLOSED FRACTURE OF RIGHT HAND, SEQUELA: Primary | ICD-10-CM

## 2019-02-13 ENCOUNTER — OFFICE VISIT (OUTPATIENT)
Dept: ORTHOPEDIC SURGERY | Age: 49
End: 2019-02-13
Payer: COMMERCIAL

## 2019-02-13 ENCOUNTER — HOSPITAL ENCOUNTER (OUTPATIENT)
Dept: GENERAL RADIOLOGY | Age: 49
Discharge: HOME | End: 2019-02-15
Payer: COMMERCIAL

## 2019-02-13 VITALS
SYSTOLIC BLOOD PRESSURE: 134 MMHG | DIASTOLIC BLOOD PRESSURE: 80 MMHG | WEIGHT: 179 LBS | HEIGHT: 65 IN | BODY MASS INDEX: 29.82 KG/M2 | HEART RATE: 74 BPM

## 2019-02-13 DIAGNOSIS — S62.317G: Primary | ICD-10-CM

## 2019-02-13 DIAGNOSIS — S62.91XS CLOSED FRACTURE OF RIGHT HAND, SEQUELA: ICD-10-CM

## 2019-02-13 PROCEDURE — 73130 X-RAY EXAM OF HAND: CPT

## 2019-02-13 PROCEDURE — 99213 OFFICE O/P EST LOW 20 MIN: CPT | Performed by: FAMILY MEDICINE

## 2019-02-19 ENCOUNTER — HOSPITAL ENCOUNTER (OUTPATIENT)
Dept: CT IMAGING | Age: 49
Discharge: HOME | End: 2019-02-21
Payer: COMMERCIAL

## 2019-02-19 DIAGNOSIS — S62.317G: ICD-10-CM

## 2019-02-19 PROCEDURE — 73200 CT UPPER EXTREMITY W/O DYE: CPT

## 2019-05-02 ENCOUNTER — OFFICE VISIT (OUTPATIENT)
Dept: FAMILY MEDICINE CLINIC | Age: 49
End: 2019-05-02
Payer: COMMERCIAL

## 2019-05-02 VITALS
DIASTOLIC BLOOD PRESSURE: 72 MMHG | BODY MASS INDEX: 29.32 KG/M2 | HEIGHT: 65 IN | OXYGEN SATURATION: 97 % | WEIGHT: 176 LBS | SYSTOLIC BLOOD PRESSURE: 116 MMHG | TEMPERATURE: 97.7 F | HEART RATE: 79 BPM

## 2019-05-02 DIAGNOSIS — N39.0 URINARY TRACT INFECTION WITH HEMATURIA, SITE UNSPECIFIED: ICD-10-CM

## 2019-05-02 DIAGNOSIS — R31.9 URINARY TRACT INFECTION WITH HEMATURIA, SITE UNSPECIFIED: ICD-10-CM

## 2019-05-02 DIAGNOSIS — G25.81 RESTLESS LEG SYNDROME: ICD-10-CM

## 2019-05-02 DIAGNOSIS — R30.0 BURNING WITH URINATION: Primary | ICD-10-CM

## 2019-05-02 DIAGNOSIS — M54.50 CHRONIC BILATERAL LOW BACK PAIN WITHOUT SCIATICA: ICD-10-CM

## 2019-05-02 DIAGNOSIS — G89.29 CHRONIC BILATERAL LOW BACK PAIN WITHOUT SCIATICA: ICD-10-CM

## 2019-05-02 DIAGNOSIS — A59.9 TRICHIMONIASIS: ICD-10-CM

## 2019-05-02 DIAGNOSIS — Z72.0 TOBACCO USE: ICD-10-CM

## 2019-05-02 LAB
BILIRUBIN, POC: NEGATIVE
BLOOD URINE, POC: 250
CLARITY, POC: CLEAR
COLOR, POC: CLEAR
GLUCOSE URINE, POC: NORMAL
KETONES, POC: NEGATIVE
LEUKOCYTE EST, POC: ABNORMAL
NITRITE, POC: NEGATIVE
PH, POC: 6
PROTEIN, POC: ABNORMAL
SPECIFIC GRAVITY, POC: 1
URINE CULTURE, ROUTINE: NORMAL
UROBILINOGEN, POC: NORMAL

## 2019-05-02 PROCEDURE — 99204 OFFICE O/P NEW MOD 45 MIN: CPT | Performed by: FAMILY MEDICINE

## 2019-05-02 PROCEDURE — 81002 URINALYSIS NONAUTO W/O SCOPE: CPT | Performed by: FAMILY MEDICINE

## 2019-05-02 RX ORDER — CIPROFLOXACIN 500 MG/1
500 TABLET, FILM COATED ORAL 2 TIMES DAILY
Qty: 14 TABLET | Refills: 0 | Status: SHIPPED | OUTPATIENT
Start: 2019-05-02 | End: 2020-03-11

## 2019-05-02 RX ORDER — MELOXICAM 15 MG/1
15 TABLET ORAL DAILY
Qty: 30 TABLET | Refills: 3 | Status: SHIPPED | OUTPATIENT
Start: 2019-05-02

## 2019-05-02 RX ORDER — METRONIDAZOLE 500 MG/1
2000 TABLET ORAL ONCE
Qty: 4 TABLET | Refills: 0 | Status: SHIPPED | OUTPATIENT
Start: 2019-05-02 | End: 2020-03-11

## 2019-05-02 ASSESSMENT — ENCOUNTER SYMPTOMS
SHORTNESS OF BREATH: 0
VOMITING: 0
NAUSEA: 0
ABDOMINAL PAIN: 1
BACK PAIN: 1

## 2019-05-02 ASSESSMENT — PATIENT HEALTH QUESTIONNAIRE - PHQ9
2. FEELING DOWN, DEPRESSED OR HOPELESS: 0
SUM OF ALL RESPONSES TO PHQ QUESTIONS 1-9: 0
SUM OF ALL RESPONSES TO PHQ9 QUESTIONS 1 & 2: 0
SUM OF ALL RESPONSES TO PHQ QUESTIONS 1-9: 0
1. LITTLE INTEREST OR PLEASURE IN DOING THINGS: 0

## 2019-05-02 NOTE — PROGRESS NOTES
[address] [address]  Dept: [phone]  Dept Fax: [phone]    Carol Pool is a 51 y.o. female who presents today for her medical conditions/complaints as noted below. Carol Pool c/o of Establish Care and Urinary Tract Infection      HPI:     HPI   Pt reports having issues off and on, no prior antibiotics   Only rare elevated temp, up to 102 last pm.    No prior urology evaluation    Complete Abdominial Hyst 15 years ago for endometriosis since 14 yo. No prior STD issues. Lower back pain for several years persisting.  1 year after 3 years of marriage and pain approx 1 year with intercourse. BP Readings from Last 3 Encounters:   05/02/19 116/72   02/13/19 134/80   01/23/19 124/84          (goal 120/80)    Past Medical History:   Diagnosis Date    Hypertension     Migraine     Panic attack       Past Surgical History:   Procedure Laterality Date    COLON SURGERY  9664    complication after hyst    HYSTERECTOMY, TOTAL ABDOMINAL  2004       No family history on file. Social History     Tobacco Use    Smoking status: Current Every Day Smoker     Packs/day: 1.00     Years: 33.00     Pack years: 33.00     Types: Cigarettes    Smokeless tobacco: Never Used   Substance Use Topics    Alcohol use: No      Prior to Admission medications    Medication Sig Start Date End Date Taking? Authorizing Provider   ciprofloxacin (CIPRO) 500 MG tablet Take 1 tablet by mouth 2 times daily for 7 days 5/2/19 5/9/19 Yes Benitez Malcolm MD   meloxicam (MOBIC) 15 MG tablet Take 1 tablet by mouth daily 5/2/19  Yes Benitez Malcolm MD   gabapentin (NEURONTIN) 300 MG capsule Take 300 mg by mouth 3 times daily   Yes Historical Provider, MD     Allergies   Allergen Reactions    Benadryl [Diphenhydramine] Other (See Comments)     Pain in legs, makes her restless leg syndrome worse.        Health Maintenance   Topic Date Due    Pneumococcal 0-64 years Vaccine (1 of 1 - PPSV23) 08/01/1975    HIV screen  08/01/1984    DTaP/Tdap/Td vaccine (1 - Tdap) 08/01/1988    Lipid screen  08/01/2009    Flu vaccine (Season Ended) 09/01/2019       Subjective:      Review of Systems   Constitutional: Positive for fatigue and fever (off and on). Respiratory: Negative for shortness of breath. Cardiovascular: Negative for chest pain. Gastrointestinal: Positive for abdominal pain (real bad cramps). Negative for nausea and vomiting. Genitourinary: Positive for decreased urine volume, frequency, vaginal discharge (white mucos) and vaginal pain. Burning and painful when she urinates, has used AZO and other OTC pills and Monistat to get ride of it nothing has worked, this is going on two weeks. Having a lot of discharge, white mucous color. Little bit of odor. Had blood in urine last night, had a pap done 8 months ago and everything came back normal (per pt)    Had hysterectomy 15 years ago   Musculoskeletal: Positive for back pain. Neurological: Negative for headaches. Objective:     /72 (Site: Left Upper Arm, Position: Sitting, Cuff Size: Small Adult)   Pulse 79   Temp 97.7 °F (36.5 °C) (Tympanic)   Ht 5' 5\" (1.651 m)   Wt 176 lb (79.8 kg)   SpO2 97%   BMI 29.29 kg/m²     Physical Exam   Constitutional: She is oriented to person, place, and time. She appears well-developed and well-nourished. No distress. Neck: Neck supple. No thyromegaly present. Cardiovascular: Normal rate and regular rhythm. No murmur heard. Pulmonary/Chest: Effort normal and breath sounds normal.   Musculoskeletal: She exhibits no edema or deformity. Cervical back: Normal.        Thoracic back: Normal.        Lumbar back: She exhibits tenderness (bilateral paravertebral and lateral tenderness L-35) and bony tenderness (L 3-5). Lymphadenopathy:     She has no cervical adenopathy. Neurological: She is alert and oriented to person, place, and time. Psychiatric: She has a normal mood and affect. Assessment:     1. Burning with urination    2. Urinary tract infection with hematuria, site unspecified    3. Tobacco use    4. Restless leg syndrome    5. Chronic bilateral low back pain without sciatica    6. Trichimoniasis      Results for POC orders placed in visit on 05/02/19   POCT Urinalysis no Micro   Result Value Ref Range    Color, UA clear     Clarity, UA clear     Glucose, UA POC normal     Bilirubin, UA negative     Ketones, UA negative     Spec Grav, UA 1.005     Blood, UA      pH, UA 6     Protein, UA POC trace     Urobilinogen, UA normal     Leukocytes, UA +++     Nitrite, UA negative            Plan:     Orders Placed This Encounter   Procedures    Neisseria gonorrhoeae DNA, Urine     Standing Status:   Future     Standing Expiration Date:   5/1/2020    Chlamydia trachomatis DNA, Urine     Standing Status:   Future     Standing Expiration Date:   5/2/2020    Urine Culture     Standing Status:   Future     Standing Expiration Date:   5/1/2020     Order Specific Question:   Specify (ex-cath, midstream, cysto, etc)? Answer:   midstream    Urine Culture    POCT Urinalysis no Micro       Orders Placed This Encounter   Medications    metroNIDAZOLE (FLAGYL) 500 MG tablet     Sig: Take 4 tablets by mouth once for 1 dose     Dispense:  4 tablet     Refill:  0    ciprofloxacin (CIPRO) 500 MG tablet     Sig: Take 1 tablet by mouth 2 times daily for 7 days     Dispense:  14 tablet     Refill:  0    meloxicam (MOBIC) 15 MG tablet     Sig: Take 1 tablet by mouth daily     Dispense:  30 tablet     Refill:  3        Return in about 4 months (around 9/2/2019) for Back pain, vaginitis. Discussed use, benefit, and side effects of prescribed medications. All patient questions answered. Pt voiced understanding. Reviewed health maintenance, pneumovax 23. Instructed to continue current medications, diet and exercise.   Patient agreed with

## 2019-05-06 DIAGNOSIS — R30.0 BURNING WITH URINATION: ICD-10-CM

## 2019-10-16 ENCOUNTER — HOSPITAL ENCOUNTER (OUTPATIENT)
Dept: NEUROLOGY | Age: 49
Discharge: HOME | End: 2019-10-16
Payer: COMMERCIAL

## 2019-10-16 PROCEDURE — 95912 NRV CNDJ TEST 11-12 STUDIES: CPT

## 2019-10-16 PROCEDURE — 95886 MUSC TEST DONE W/N TEST COMP: CPT

## 2020-03-11 ENCOUNTER — OFFICE VISIT (OUTPATIENT)
Dept: FAMILY MEDICINE CLINIC | Age: 50
End: 2020-03-11
Payer: COMMERCIAL

## 2020-03-11 VITALS
OXYGEN SATURATION: 97 % | BODY MASS INDEX: 34.99 KG/M2 | SYSTOLIC BLOOD PRESSURE: 118 MMHG | DIASTOLIC BLOOD PRESSURE: 82 MMHG | HEIGHT: 65 IN | WEIGHT: 210 LBS | HEART RATE: 88 BPM

## 2020-03-11 LAB
BILIRUBIN, POC: NEGATIVE
BLOOD URINE, POC: 250
CLARITY, POC: CLEAR
COLOR, POC: YELLOW
GLUCOSE URINE, POC: NORMAL
KETONES, POC: NEGATIVE
LEUKOCYTE EST, POC: NEGATIVE
NITRITE, POC: NEGATIVE
PH, POC: 8
PROTEIN, POC: ABNORMAL
SPECIFIC GRAVITY, POC: 1
UROBILINOGEN, POC: NORMAL

## 2020-03-11 PROCEDURE — 81002 URINALYSIS NONAUTO W/O SCOPE: CPT | Performed by: FAMILY MEDICINE

## 2020-03-11 PROCEDURE — 99213 OFFICE O/P EST LOW 20 MIN: CPT | Performed by: FAMILY MEDICINE

## 2020-03-11 RX ORDER — TOLTERODINE 2 MG/1
2 CAPSULE, EXTENDED RELEASE ORAL DAILY
Qty: 30 CAPSULE | Refills: 3 | Status: SHIPPED | OUTPATIENT
Start: 2020-03-11

## 2020-03-11 ASSESSMENT — ENCOUNTER SYMPTOMS
NAUSEA: 0
ABDOMINAL PAIN: 1
SHORTNESS OF BREATH: 0
VOMITING: 0

## 2020-03-11 ASSESSMENT — PATIENT HEALTH QUESTIONNAIRE - PHQ9
SUM OF ALL RESPONSES TO PHQ QUESTIONS 1-9: 0
SUM OF ALL RESPONSES TO PHQ QUESTIONS 1-9: 0
2. FEELING DOWN, DEPRESSED OR HOPELESS: 0
1. LITTLE INTEREST OR PLEASURE IN DOING THINGS: 0
SUM OF ALL RESPONSES TO PHQ9 QUESTIONS 1 & 2: 0

## 2020-03-11 NOTE — PROGRESS NOTES
[facility]  Dept: [phone]  Dept Fax:[phone]      Nazia Torres is a 47 y.o. female who presents today for her medical conditions/complaints as noted below. Chief Complaint   Patient presents with    Incontinence       HPI:     HPI  Pt here for concerns of incontinence. Patient reports she is having difficulty holding her urine. His painful to use the restroom though no burning she does have a urgency and frequency and these have been noticed for approximately past week. Line no fevers noted. Last UTI was long ago. Abnormal urine May 2019 negative culture. She does report having some abdominal pain as well as flank pain and pelvic pain along with the urgency and frequency. Pt is smoker  Similar issues to last year  Dyspareunia noted also recently    Prior to Visit Medications    Medication Sig Taking? Authorizing Provider   meloxicam (MOBIC) 15 MG tablet Take 1 tablet by mouth daily  Herson Oviedo MD   gabapentin (NEURONTIN) 300 MG capsule Take 300 mg by mouth 3 times daily  Historical Provider, MD       Allergies   Allergen Reactions    Benadryl [Diphenhydramine] Other (See Comments)     Pain in legs, makes her restless leg syndrome worse.        Past Medical History:   Diagnosis Date    Hypertension     Migraine     Panic attack      Past Surgical History:   Procedure Laterality Date    COLON SURGERY  8766    complication after hyst    HYSTERECTOMY, TOTAL ABDOMINAL  2004     Social History     Socioeconomic History    Marital status:      Spouse name: Not on file    Number of children: Not on file    Years of education: Not on file    Highest education level: Not on file   Occupational History    Not on file   Social Needs    Financial resource strain: Not on file    Food insecurity     Worry: Not on file     Inability: Not on file    Transportation needs     Medical: Not on file Non-medical: Not on file   Tobacco Use    Smoking status: Current Every Day Smoker     Packs/day: 0.25     Years: 33.00     Pack years: 8.25     Types: Cigarettes    Smokeless tobacco: Never Used    Tobacco comment: 4 cigs daily   Substance and Sexual Activity    Alcohol use: No    Drug use: No    Sexual activity: Not Currently     Partners: Male     Comment: due to pain x 2 years   Lifestyle    Physical activity     Days per week: Not on file     Minutes per session: Not on file    Stress: Not on file   Relationships    Social connections     Talks on phone: Not on file     Gets together: Not on file     Attends Confucianist service: Not on file     Active member of club or organization: Not on file     Attends meetings of clubs or organizations: Not on file     Relationship status: Not on file    Intimate partner violence     Fear of current or ex partner: Not on file     Emotionally abused: Not on file     Physically abused: Not on file     Forced sexual activity: Not on file   Other Topics Concern    Not on file   Social History Narrative    Not on file     No family history on file. Subjective:      Review of Systems   Constitutional: Negative for fatigue and fever. Respiratory: Negative for shortness of breath. Cardiovascular: Negative for chest pain. Gastrointestinal: Positive for abdominal pain. Negative for nausea and vomiting. Genitourinary: Positive for flank pain, frequency, pelvic pain and urgency. Negative for decreased urine volume, difficulty urinating, dyspareunia, dysuria, enuresis, genital sores, hematuria, menstrual problem, vaginal bleeding, vaginal discharge and vaginal pain. Can not hold urine in, its painful when she uses the restroom, no burning. Has urgency with urination and frequency, this started back up a week ago. Neurological: Negative for dizziness and headaches.        Objective:     /82 (Site: Left Upper Arm, Position: Sitting, Cuff Size: Large POCT Urinalysis no Micro       Electronically signed by Elizabeth Davenport MD on 3/12/2020 at10:40 PM

## 2020-04-06 ENCOUNTER — VIRTUAL VISIT (OUTPATIENT)
Dept: UROLOGY | Age: 50
End: 2020-04-06
Payer: COMMERCIAL

## 2020-04-06 PROCEDURE — 99204 OFFICE O/P NEW MOD 45 MIN: CPT | Performed by: UROLOGY

## 2020-04-06 NOTE — PROGRESS NOTES
JUAN Pritchett MD        [address] [address]  Dept: [phone]  Dept Fax: [phone]  Loc: [address] [place] [address] Urology Office Note -     Patient:  Paul Hadley  YOB: 1970  Date: 4/6/2020    The patient is a 47 y.o. female who presents today for evaluation of the following problems:   Chief Complaint   Patient presents with    Other     dysuria, microhematuria, incontinence    referred/consultation requested by Lamar Huizar MD.    HISTORY OF PRESENT ILLNESS:     Microhematuria  Onset was weeks ago  Overall, the problem(s) are unchanged. Severity is described as mild-moderate. Associated Symptoms: No dysuria,  gross hematuria. Pt with extensive smoking hx      Urge Incontinence- urge/frequency. Worsening. No stress incontinence. Wears pads daily. Patient with Pain in flank and pelvis- 8/10 pain at all times          Summary of Previous Records:  Pt here for concerns of incontinence. Patient reports she is having difficulty holding her urine. His painful to use the restroom though no burning she does have a urgency and frequency and these have been noticed for approximately past week. Line no fevers noted. Last UTI was long ago. Abnormal urine May 2019 negative culture.     She does report having some abdominal pain as well as flank pain and pelvic pain along with the urgency and frequency.     Pt is smoker  Similar issues to last year  Dyspareunia noted also recently      Requested/reviewed records from Lamar Huizar MD office and/or outside physician/EMR    (Patient's old records have been requested, reviewed and pertinent findings summarized in today's note.)    Last several PSA's:  No results found for: PSA    Last total testosterone:  No results found for: TESTOSTERONE    Urinalysis today:  No results found for this visit on 04/06/20.     Last BUN and visit.  There is no height or weight on file to calculate BMI. Constitutional: Patient in no acute distress; Neuro: alert and oriented. Able to follow commands. Psych: Mood and affect normal. No hallucinations  HENT: normocephalic, atraumatic. External ears nomral  Skin: no erythematous lesions or abrasions on the facial skin  Lungs: Respiratory effort normal, Symmetric chest rise. No signs of respiratory distress  Cardiovascular: No evidence of cyanosis  Abdomen: Nondistended. Symmetric. Extremities: no appreciable difficulties with gait        Assessment and Plan        1. Pelvic pain    2. Nicotine abuse    3. Microhematuria    4. OAB (overactive bladder)               Plan:      Pelvic pain could be due to overflow incontinence  Microhematuria with extensive smoking hx- will get CT urogram  Will arrange for local cystoscopy after covid-19 restrictions  Will not give anticholinergics at this time for OAB as it could be related to retention. Further recs to follow. Luz Porter was evaluated by a Virtual Visit (video  visit) encounter to address concerns as mentioned above. A caregiver was present when appropriate. Due to this being a TeleHealth encounter (During [facility] public health emergency), evaluation of the following organ systems was limited: Vitals/Constitutional/EENT/Resp/CV/GI//MS/Neuro/Skin/Heme-Lymph-Imm. Pursuant to the emergency declaration under the [address] [place] [address], [address] [place] [address] authority and the [employer] Act, this Virtual Visit was conducted with patient's (and/or legal guardian's) consent, to reduce the patient's risk of exposure to COVID-19 and provide necessary medical care. The patient (and/or legal guardian) has also been advised to contact this office for worsening conditions or problems, and seek emergency medical treatment and/or call 911 if deemed necessary. I was located at home.

## 2020-12-09 ENCOUNTER — OFFICE VISIT (OUTPATIENT)
Dept: NEUROLOGY | Age: 50
End: 2020-12-09
Payer: COMMERCIAL

## 2020-12-09 VITALS
HEART RATE: 72 BPM | WEIGHT: 175.8 LBS | BODY MASS INDEX: 29.29 KG/M2 | DIASTOLIC BLOOD PRESSURE: 82 MMHG | TEMPERATURE: 97.7 F | RESPIRATION RATE: 16 BRPM | OXYGEN SATURATION: 98 % | HEIGHT: 65 IN | SYSTOLIC BLOOD PRESSURE: 122 MMHG

## 2020-12-09 PROBLEM — G89.29 CHRONIC LOW BACK PAIN WITH SCIATICA: Status: ACTIVE | Noted: 2020-12-09

## 2020-12-09 PROBLEM — G56.03 BILATERAL CARPAL TUNNEL SYNDROME: Status: ACTIVE | Noted: 2020-12-09

## 2020-12-09 PROBLEM — R21 SKIN RASH: Status: ACTIVE | Noted: 2020-12-09

## 2020-12-09 PROBLEM — G47.9 SLEEP DIFFICULTIES: Status: ACTIVE | Noted: 2020-12-09

## 2020-12-09 PROBLEM — M54.40 CHRONIC LOW BACK PAIN WITH SCIATICA: Status: ACTIVE | Noted: 2020-12-09

## 2020-12-09 PROBLEM — G50.0 TRIGEMINAL NEURALGIA OF RIGHT SIDE OF FACE: Status: ACTIVE | Noted: 2020-12-09

## 2020-12-09 PROBLEM — G25.81 RLS (RESTLESS LEGS SYNDROME): Status: ACTIVE | Noted: 2020-12-09

## 2020-12-09 PROBLEM — F17.200 SMOKER: Status: ACTIVE | Noted: 2020-12-09

## 2020-12-09 PROBLEM — F41.9 ANXIETY: Status: ACTIVE | Noted: 2020-12-09

## 2020-12-09 PROBLEM — F32.A DEPRESSION: Status: ACTIVE | Noted: 2020-12-09

## 2020-12-09 PROCEDURE — 99245 OFF/OP CONSLTJ NEW/EST HI 55: CPT | Performed by: PSYCHIATRY & NEUROLOGY

## 2020-12-09 RX ORDER — TOPIRAMATE 100 MG/1
TABLET, FILM COATED ORAL
Qty: 60 TABLET | Refills: 1 | Status: SHIPPED | OUTPATIENT
Start: 2020-12-09 | End: 2021-01-21 | Stop reason: SDUPTHER

## 2020-12-09 RX ORDER — SUMATRIPTAN 100 MG/1
100 TABLET, FILM COATED ORAL
Qty: 12 TABLET | Refills: 1 | Status: SHIPPED | OUTPATIENT
Start: 2020-12-09 | End: 2021-01-21 | Stop reason: SDUPTHER

## 2020-12-09 ASSESSMENT — ENCOUNTER SYMPTOMS
FACIAL SWEATING: 0
COUGH: 0
BACK PAIN: 0
ABDOMINAL PAIN: 0
ABDOMINAL DISTENTION: 0
VOMITING: 0
EYE REDNESS: 0
PHOTOPHOBIA: 1
NAUSEA: 0
DIARRHEA: 0
CHOKING: 0
FACIAL SWELLING: 0
SWOLLEN GLANDS: 0
EYE DISCHARGE: 0
TROUBLE SWALLOWING: 0
SINUS PRESSURE: 0
SHORTNESS OF BREATH: 0
VOICE CHANGE: 0
CHEST TIGHTNESS: 0
VISUAL CHANGE: 0
BLURRED VISION: 0
EYE WATERING: 0
BLOOD IN STOOL: 0
BOWEL INCONTINENCE: 0
SCALP TENDERNESS: 0
SORE THROAT: 0
APNEA: 0
CONSTIPATION: 0
RHINORRHEA: 0
COLOR CHANGE: 0
EYE PAIN: 0
WHEEZING: 0
EYE ITCHING: 0

## 2020-12-09 NOTE — PROGRESS NOTES
muscle aches, nausea, neck pain, numbness, rhinorrhea, scalp tenderness, seizures, sinus pressure, sore throat, swollen glands, tingling, tinnitus, visual change, vomiting, weakness or weight loss. The symptoms are aggravated by unknown. She has tried triptans (TOPAMAX) for the symptoms. The treatment provided moderate relief. Her past medical history is significant for migraine headaches. There is no history of cancer, cluster headaches, hypertension, immunosuppression, migraines in the family, obesity, pseudotumor cerebri, recent head traumas, sinus disease or TMJ. History obtained from  The patient     and other  available   medical  records   were  Also  reviewed. The  Duration,  Quality,  Severity,  Location,  Timing,  Context,  Modifying  Factors   Of   The   Chief   Complaint       And  Present  Illness  Was   Reviewed   In   Chronological   [facility]. PATIENT'S  MAIN  CONCERNS INCLUDE :                       1)       H/O    SHARP    ELECTRIC  LIKE   SHOOTING PAIN                                     STARING      AROUND       RIGHT    EYE        AND                                        EXTENDING  TO  ADJACENT  SIDE OF  FACE                                   LASTING  FOR    30   MINUTES   ALMOST  DAILY     SINCE                                         SEPT. 2020                               -   TRIGEMINAL  NEURALGIA   IN   RIGHT  OPTHALMIC  DIVISION                             2)    HAD   ENT   EVALUATIONS  IN  OCT. 2020                                     CT     SINUSES    SHOWED   NO  ACUTE    PATHOLOGY                            3)    PATIENT     TRIED    WITH  TEGRETOL     IN   OCT.  2020                                      WHICH   CAUSED    ALLERGIC    REACTION    AND                                 SAME  WAS   DISCONTINUED                              4)   H/O     CHRONIC    RESTLESS  SYNDROME                               PATIENT  TAKING medications/alcohol, time of day/darkness/light  Are  absent                Patient   Indicates   The  Presence   And  The  Absence  Of  The  Following    Associated  And             Additional  Neurological    Symptoms:                                Balance  And coordination   problems  absent           Gait problems     absent            Headaches      present              Migraines           present           Memory problemsabsent              Confusion        absent            Paresthesia   numbness          absent           Seizures  And  Starring  Episodes           absent           Syncope,  Near  syncopal episodes         absent           Speech   problems           absent             Swallowing   Problems      absent            Dizziness,  Light headedness           absent              Vertigo        absent             Generalized   Weakness    absent              focal  Weakness     absent             Tremors         absent              Sleep  Problems     absent             History  Of   Recent  Head  Injury     absent             History  Of   Recent  TIA     absent             History  Of   Recent    Stroke     absent             Neck  Pain   and   Neck muscle  Spasms  absent               Radiating  down   And   Weakness           absent            Lower back   Pain  And     Spasms  absent              Radiating    Down   And   Weakness          absent                H/OFALLS        absent               History  Of   Visual  Symptoms    absent                  Associated   Diplopia       absent                                               Also   Additional   Symptoms   Present    As  Documented    In   The   detailed                  Review  Of  Systems   And    Please   Refer   To    Them for   Additional    Information.                     Any components  That are either  Unobtainable  Or  Limited  In   HPI, ROS  And/or PFSH   Are                   Due   ToPatient's  Medical  Problems,  Clinical Condition   and/or lack of                                 other    Alternate   resources. RECORDS   REVIEWED:    historical medical records           INFORMATION   REVIEWED:     MEDICAL   HISTORY,SURGICAL   HISTORY,     MEDICATIONS   LIST,   ALLERGIES AND  DRUG  INTOLERANCES,       FAMILY   HISTORY,  SOCIAL  HISTORY,      PROBLEM  LIST   FOR  PATIENT  CARE   COORDINATION          Past Medical History:   Diagnosis Date    Hypertension     Migraine     Panic attack          Past Surgical History:   Procedure Laterality Date    COLON SURGERY  7121    complication after hyst    HYSTERECTOMY, TOTAL ABDOMINAL  2004         Current Outpatient Medications   Medication Sig Dispense Refill    SUMAtriptan (IMITREX) 100 MG tablet Take 1 tablet by mouth once as needed for Migraine 12 tablet 1    topiramate (TOPAMAX) 100 MG tablet 1/2    TABLET   TO  ONE  TABLET  TWICE  DAILY 60 tablet 1    gabapentin (NEURONTIN) 300 MG capsule Take 300 mg by mouth 3 times daily      tolterodine (DETROL LA) 2 MG extended release capsule Take 1 capsule by mouth daily (Patient not taking: Reported on 12/9/2020) 30 capsule 3    meloxicam (MOBIC) 15 MG tablet Take 1 tablet by mouth daily (Patient not taking: Reported on 12/9/2020) 30 tablet 3     No current facility-administered medications for this visit. Allergies   Allergen Reactions    Carbamazepine Hives    Methylprednisolone     Benadryl [Diphenhydramine] Other (See Comments)     Pain in legs, makes her restless leg syndrome worse. History reviewed. No pertinent family history.       Social History     Socioeconomic History    Marital status:      Spouse name: Not on file    Number of children: Not on file    Years of education: Not on file    Highest education level: Not on file   Occupational History    Not on file   Social Needs    Financial resource strain: Not on file    Food insecurity     Worry: Not on file     Inability: Not on file  Transportation needs     Medical: Not on file     Non-medical: Not on file   Tobacco Use    Smoking status: Current Every Day Smoker     Packs/day: 0.25     Years: 33.00     Pack years: 8.25     Types: Cigarettes    Smokeless tobacco: Never Used    Tobacco comment: 4 cigs daily   Substance and Sexual Activity    Alcohol use: No    Drug use: No    Sexual activity: Not Currently     Partners: Male     Comment: due to pain x 2 years   Lifestyle    Physical activity     Days per week: Not on file     Minutes per session: Not on file    Stress: Not on file   Relationships    Social connections     Talks on phone: Not on file     Gets together: Not on file     Attends Shinto service: Not on file     Active member of club or organization: Not on file     Attends meetings of clubs or organizations: Not on file     Relationship status: Not on file    Intimate partner violence     Fear of current or ex partner: Not on file     Emotionally abused: Not on file     Physically abused: Not on file     Forced sexual activity: Not on file   Other Topics Concern    Not on file   Social History Narrative    Not on file       Vitals:    12/09/20 1441   BP: 122/82   Pulse: 72   Resp: 16   Temp: 97.7 °F (36.5 °C)   SpO2: 98%         Wt Readings from Last 3 Encounters:   12/09/20 175 lb 12.8 oz (79.7 kg)   03/11/20 210 lb (95.3 kg)   05/02/19 176 lb (79.8 kg)         BP Readings from Last 3 Encounters:   12/09/20 122/82   03/11/20 118/82   05/02/19 116/72           Hematology and Coagulation  Lab Results   Component Value Date    WBC 15.9 04/15/2018    RBC 5.07 04/15/2018    HGB 14.8 04/15/2018    HCT 44.5 04/15/2018    MCV 87.8 04/15/2018    MCH 29.1 04/15/2018    MCHC 33.2 04/15/2018    RDW 14.8 04/15/2018     04/15/2018    MPV 9.4 04/15/2018     Chemistries  Lab Results   Component Value Date     05/14/2018    K 3.7 05/14/2018     05/14/2018    CO2 27 05/14/2018    BUN 10 05/14/2018    CREATININE 0.57 05/14/2018    CALCIUM 9.6 05/14/2018    PROT 6.7 04/15/2018    LABALBU 3.7 04/15/2018    BILITOT 0.20 04/15/2018    ALKPHOS 91 04/15/2018    AST 10 04/15/2018    ALT 18 04/15/2018     Lab Results   Component Value Date    ALKPHOS 91 04/15/2018    ALT 18 04/15/2018    AST 10 04/15/2018    PROT 6.7 04/15/2018    BILITOT 0.20 04/15/2018    BILIDIR <0.08 04/15/2018    LABALBU 3.7 04/15/2018     Lab Results   Component Value Date    BUN 10 05/14/2018    CREATININE 0.57 05/14/2018     Lab Results   Component Value Date    CALCIUM 9.6 05/14/2018     Lab Results   Component Value Date    AST 10 04/15/2018    ALT 18 04/15/2018         Review of Systems   Constitutional: Negative for appetite change, chills, diaphoresis, fatigue, fever, unexpected weight change and weight loss. HENT: Negative for congestion, dental problem, drooling, ear discharge, ear pain, facial swelling, hearing loss, mouth sores, nosebleeds, postnasal drip, rhinorrhea, sinus pressure, sore throat, tinnitus, trouble swallowing and voice change. Eyes: Positive for photophobia. Negative for blurred vision, pain, discharge, redness, itching and visual disturbance. Respiratory: Negative for apnea, cough, choking, chest tightness, shortness of breath and wheezing. Cardiovascular: Negative for chest pain, palpitations, leg swelling and near-syncope. Gastrointestinal: Negative for abdominal distention, abdominal pain, anorexia, blood in stool, bowel incontinence, constipation, diarrhea, nausea and vomiting. Endocrine: Negative for cold intolerance, heat intolerance, polydipsia, polyphagia and polyuria. Genitourinary: Negative for bladder incontinence. Musculoskeletal: Negative for arthralgias, back pain, gait problem, joint swelling, myalgias, neck pain and neck stiffness. Skin: Negative for color change, pallor, rash and wound. Allergic/Immunologic: Negative for environmental allergies, food allergies and immunocompromised state. Neurological: Positive for headaches. Negative for dizziness, vertigo, tingling, tremors, focal weakness, seizures, syncope, facial asymmetry, speech difficulty, weakness, light-headedness, numbness and loss of balance. Hematological: Negative for adenopathy. Does not bruise/bleed easily. Psychiatric/Behavioral: Positive for decreased concentration. Negative for agitation, behavioral problems, confusion, dysphoric mood, hallucinations, memory loss, self-injury, sleep disturbance and suicidal ideas. The patient is nervous/anxious. The patient does not have insomnia and is not hyperactive. OBJECTIVE:    Physical Exam  Constitutional:       Appearance: She is well-developed. HENT:      Head: Normocephalic and atraumatic. No raccoon eyes or Lima's sign. Right Ear: External ear normal.      Left Ear: External ear normal.      Nose: Nose normal.   Eyes:      Conjunctiva/sclera: Conjunctivae normal.      Pupils: Pupils are equal, round, and reactive to light. Neck:      Musculoskeletal: Normal range of motion and neck supple. Normal range of motion. No neck rigidity or muscular tenderness. Thyroid: No thyroid mass or thyromegaly. Vascular: No carotid bruit. Trachea: No tracheal deviation. Meningeal: Brudzinski's sign and Kernig's sign absent. Cardiovascular:      Rate and Rhythm: Normal rate and regular rhythm. Pulmonary:      Effort: Pulmonary effort is normal.   Musculoskeletal:         General: No tenderness. Skin:     General: Skin is warm. Coloration: Skin is not pale. Findings: No erythema or rash. Nails: There is no clubbing. Psychiatric:         Attention and Perception: She is attentive. Mood and Affect: Mood is not anxious or depressed. Affect is not labile, blunt or inappropriate. Speech: She is communicative.  Speech is not rapid and pressured, delayed, slurred or tangential.         Behavior: Behavior is not agitated, slowed, aggressive, withdrawn, hyperactive or combative. Behavior is cooperative. Thought Content: Thought content is not paranoid or delusional. Thought content does not include homicidal or suicidal ideation. Thought content does not include homicidal or suicidal plan. Cognition and Memory: Memory is not impaired. She does not exhibit impaired recent memory or impaired remote memory. Judgment: Judgment is not impulsive or inappropriate. NEUROLOGICALEXAMINATION :       A) MENTAL STATUS:                   Alert and  oriented  To time, place  And  Person. No Aphasia. No  Dysarthria. Able   To  Follow     SIMPLE    commands   without   Any  Difficulty. No right  To left confusion. Normal  Speech  And language function. Insight and  Judgment ,Fund  Of  Knowledge   within normal limits                Recent  And  Remote memory  within   normal limits                Attention &  Concentration are within   normal limits                                                   B) CRANIAL NERVES :        CN : Visual  Acuity  And  Visual fields  within normal limits               Fundi  Could  Not  Be  Could  Not  Be  Evaluated. 3,4,6 CN : Both  Pupils are   Reactive and  Equal.  Movements  Are  Intact. No  Nystagmus. No  MAUDE. No  Afferent  Pupillary  Defect noted. 5 CN :  Normal  Facial sensations and Corneal  Reflexes           7 CN:  Normal  Facial  Symmetry  And  Strength. No facial  Weakness.            8 CN :  Hearing  Appears within normal limits          9, 10 CN: Normal   spontaneous, reflex   palate   movements         11 CN:   Normal  Shoulder  shrug and  strength         12 CN :   Normal  Tongue movements and  Tongue  In midline                        No tongue   Fasciculations or atrophy       C) MOTOR  EXAM:                 Strength  In upper  And  Lower extremities   within   normal limits               No  Drift. No  Atrophy               Rapid   alternating  And  repetitions  Movements  within   normal limits                 Muscle  Tone  In upper  And  Lower  Extremities  normal                No rigidity. No  Spasticity. Bradykinesia   absent                 No  Asterixis. Sustention  Tremor , Resting   Tremor   absent                    No   other  Abnormal  Movements noted           D) SENSORY :               Light   touch, pinprick,   position  And  Vibration  within normal limits        E) REFLEXES:                   Deep  Tendon  Reflexes   normal                  No  pathological  Reflexes  Bilaterally. F) COORDINATION  AND  GAIT :                                Station and  Gait  normal                              Romberg 's test   POSITIVE                            Ataxia negative      ASSESSMENT:      Patient Active Problem List   Diagnosis    Tobacco use    Chronic bilateral low back pain without sciatica    Restless leg syndrome    Anxiety    Chronic migraine    Depression    RLS (restless legs syndrome)    Sleep difficulties    Smoker    Chronic low back pain with sciatica    Skin rash    Bilateral carpal tunnel syndrome    Trigeminal neuralgia of right side of face         VISITING DIAGNOSIS:      ICD-10-CM    1. Trigeminal neuralgia of right side of face  G50.0 Sedimentation Rate     CBC     JULIEN Screen with Reflex     Rheumatoid Factor     HERPES PROFILE     Lupus Anticoagulant     TSH without Reflex     T. pallidum Ab     MRI BRAIN W WO CONTRAST   2. Tobacco use  Z72.0 XR CHEST STANDARD (2 VW)   3. Chronic bilateral low back pain without sciatica  M54.5 Sedimentation Rate    G89.29 CBC     JULIEN Screen with Reflex     Rheumatoid Factor     HERPES PROFILE     Lupus Anticoagulant     TSH without Reflex     T. pallidum Ab     MRI BRAIN W WO CONTRAST   4.  Restless leg syndrome  G25.81 Sedimentation Rate     CBC     JULIEN Screen with Reflex     Rheumatoid Factor     HERPES PROFILE     Lupus Anticoagulant     TSH without Reflex     T. pallidum Ab     MRI BRAIN W WO CONTRAST   5. Anxiety  F41.9    6. Chronic migraine  G43.709 Sedimentation Rate     CBC     JULIEN Screen with Reflex     Rheumatoid Factor     HERPES PROFILE     Lupus Anticoagulant     TSH without Reflex     T. pallidum Ab     MRI BRAIN W WO CONTRAST   7. Other depression  F32.89    8. RLS (restless legs syndrome)  G25.81 Sedimentation Rate     CBC     JULIEN Screen with Reflex     Rheumatoid Factor     HERPES PROFILE     Lupus Anticoagulant     TSH without Reflex     T. pallidum Ab     MRI BRAIN W WO CONTRAST   9. Sleep difficulties  G47.9 Sedimentation Rate     CBC     JULIEN Screen with Reflex     Rheumatoid Factor     HERPES PROFILE     Lupus Anticoagulant     TSH without Reflex     T. pallidum Ab     MRI BRAIN W WO CONTRAST   10. Smoker  F17.200 XR CHEST STANDARD (2 VW)   11. Chronic low back pain with sciatica, sciatica laterality unspecified, unspecified back pain laterality  M54.40 Sedimentation Rate    G89.29 CBC     JULIEN Screen with Reflex     Rheumatoid Factor     HERPES PROFILE     Lupus Anticoagulant     TSH without Reflex     T. pallidum Ab     MRI BRAIN W WO CONTRAST   12. Bilateral carpal tunnel syndrome  G56.03 Sedimentation Rate     CBC     JULIEN Screen with Reflex     Rheumatoid Factor     HERPES PROFILE     Lupus Anticoagulant     TSH without Reflex     T. pallidum Ab     MRI BRAIN W WO CONTRAST   13. Skin rash  R21 Sedimentation Rate     CBC     JULIEN Screen with Reflex     Rheumatoid Factor     HERPES PROFILE     Lupus Anticoagulant     TSH without Reflex     T. pallidum Ab     MRI BRAIN W WO CONTRAST              CONCERNS   &   INCREASED   RISK   FOR            *    COMPLICATED  MIGRAINES       *    NEUROPATHIC  PAIN                  VARIOUS  RISK   FACTORS   WERE  REVIEWED   AND   DISCUSSED.           PLAN: Elham Kwon  Of  The  Diagnoses,  The  Management & Treatment  Options            AND    Care  plan  Were          Reviewed and   Discussed   With  patient. * Goals  And  Expectations  Of  The  Therapy  Discussed   And  Reviewed. *   Benefits   And   Side  Effect  Profile  Of  Medication/s   Were   Discussed                * Need   For  Further   Follow up For  The  Various  Problems Were  discussed. * Results  Of  The  Previous  Diagnostic tests were reviewed and  discussed                   Medical  Decision  Making  Was  Made  Based on the   Complexity  Of  Above  Mentioned  Diagnoses,    Data reviewed             And    Risk  Of  Significant   Co morbidities and   complicating   Factors. Medical  Decision  Was   High     Complexity   Due   To  The  Patient's  Multiple  Symptoms,  Advancing   Disease,            Complex  Treatment  Regimen,  Multiple medications           and   Risk  Of   Side  Effects,  Difficulty  In  Medication  Management  And  Diagnostic  Challenges           In  View  Of  The  Associated   Co  Morbid  Conditions   And  Problems. *   ADEQUATE   FLUID  INTAKE   AND  ELECTROLYTE  BALANCE           * KEEP  DAIRY  OF   THE  NEUROLOGICAL  SYMPTOMS        RECORDING THE    DURATION  AND  FREQUENCY. *  AVOID    CONDITIONS  AND  FACTORS   THAT  MAKE                  NEUROLOGICAL  SYMPTOMS  WORSE.                    *TO  MAINTAIN  REGULAR  SLEEP  WAKE  CYCLES. *   TO  HAVE  ADEQUATE  REST  AND   SLEEP    HOURS.              *    AVOID  ANY USAGE OF    TOBACCO,          AVOID  EXCESSIVE  ALCOHOL  AND   ILLEGAL   SUBSTANCES          *  CONTINUE   MEDICATIONS    PRESCRIBED       AS    RECOMMENDED       *   Compliance   With  Medications   And  Instructions          *    MIGRAINE/ HEADACHE    DAIRY   WITH  MONITORING                       OF  DURATION  AND  FREQUENCY.             *    Antiplatelet WITH   PRIMARY  CARE         OTHER  CONSULTANTS  AS  BEFORE.               *TO  FOLLOW  WITH   MENTAL  HEALTH  PROFESSIONALS ,  INCLUDING            PSYCHOLOGICAL  COUNSELING   AND  PSYCHIATRIC  EVALUTIONS,                     *  Maintain   Healthy  Life Style    With   Periodic  Monitoring  Of          Any  Medical  Conditions  Including   Elevated  Blood  Pressure,  Lipid  Profile,        Blood  Sugar levels  AndHeart  Disease. *   Period   Screening  For  Cancers  Involving  Breast,  Colon,         Lungs  And  Other  Organs  As  Applicable,  In consultation   With  Your  Primary Care Providers. *Second  Neurological  Opinion  And  Evaluations  In  [facility] AND [facility]  Setting  If  Patient  Is  Interested. * Please   Contact   Neurology  Clinic   Early   If   Are  Any  New  Neurological   And  Any neurological  Concerns. *  If  The  Patient remains  Neurologically  Stable   Return   To  [facility] Neurology Department   IN    1-2      MONTHS  TIME   FOR  FURTHER              FOLLOW UP.                       *   The  Neurological   Findings,  Possible  Diagnosis,  Differential diagnoses                    And  Options  For    Further   Investigations                   And  management   Are  Discussed  Comprehensively. Medications   And  Prescription   Risks  And  Side effects  Are   Also  Discussed. *  If   There is  Any  Significant  Worsening   Of  Current  Symptoms  And  Or                  If patient  Develops   Any additional  New  NeurologicalSymptoms                  Or  Significant  Concerns   Should  Call  911 or  Go  To  Emergency  Department  For  Further  Immediate  Evaluation. The   Above  Were  Reviewed  With  patient   and                       questions  Answered  In  Detail.                       More   Than  50% of face  To face Time   Was  Spent  On Counseling                    And   Coordination  Of  Care   Of   Patient's  multiple   Neurological  Problems                         And   Comorbid  Medical   Conditions. Electronically signed by   Cori Jose M.D., [facility]. Board Certified in  Neurology &  In  [facility] and Neu[facility] ([facility])      DISCLAIMER:   Although every effort was made to ensure the accuracy of this  electronictranscription, some errors in transcription may have occurred. GENERAL PATIENT INSTRUCTIONS:      A Healthy Lifestyle: Care Instructions   Your Care Instructions   A healthy lifestyle can help you feel good, stay at ahealthy weight, and have plenty of energy for both work and play. A healthy lifestyle is something you can share with your whole family.  A healthy lifestyle also can lower your risk for serious health problems, such ashigh blood pressure, heart disease, and diabetes.  You can follow a few steps listed below to improve your health and the health of your family.  Follow-up careis a key part of your treatment and safety. Be sure to make and go to all appointments, and call your doctor if you are having problems. Its also a good idea to know your test results and keep a list of the medicines you take.  How can you care for yourself at home?  Do not eat too much sugar, fat, or fast foods. You can still have dessert and treats nowand then. The goal is moderation.  Start small to improve your eating habits. Pay attention to portion sizes, drink less juice and soda pop, and eat more fruits and vegetables.  Eat a healthy amount of food. A 3-ounce serving of meat, for example, is about the size of a deck of cards. Fill the rest of your plate with vegetables and whole grains.  Limit theamount of soda and sports drinks you have every day. Drink more water when you are thirsty.  Eat at least 5 servings of fruits and vegetables every day.  It may seem like a lot, but it is not hard to reach this goal. Aserving or helping is 1 piece of fruit, 1 cup of vegetables, or 2 cups of leafy, raw vegetables. Have an apple or some carrot sticks as an afternoon snack instead of a candy bar. Try to have fruits and/or vegetables at everymeal.   Make exercise part of your daily routine. You may want to start with simple activities, such as walking, bicycling, or slow swimming. Try deirdre active 30 to 60 minutes every day. You do not need to do all 30 to 60 minutes all at once. For example, you can exercise 3 times a day for 10 or 20 minutes. Moderate exercise is safe for most people, but it is always agood idea to talk to your doctor before starting an exercise program.   Keep moving. Belinda Gibran the lawn, work in the garden, or [employer]. Take the stairs instead of the elevator at work.  If you smoke, quit. Peoplewho smoke have an increased risk for heart attack, stroke, cancer, and other lung illnesses. Quitting is hard, but there are ways to boost your chance of quitting tobacco for good.  Use nicotine gum, patches, or lozenges.  Ask your doctor about stop-smoking programs and medicines.  Keep trying.  In addition to reducing your risk of diseases in the future, you will notice some benefits soon after you stop using tobacco. If you have shortness of breath or asthma symptoms, they will likely getbetter within a few weeks after you quit.  Limit how much alcohol you drink. Moderate amounts of alcohol (up to 2 drinks a day for men, 1drink a day for women) are okay. But drinking too much can lead to liver problems, high blood pressure, and other health problems.  health   If you have a family, there are many things you can do together to improve your health.  Eat meals together as a family as often as possible.  Eat healthy foods. This includes fruits, vegetables, lean meats and dairy, and whole grains.  Include your family in your fitness plan.  Most peoplethink of activities such as jogging or tennis as the way to fitness, but there are many ways you and your family can be more active. Anything that makes you breathe hard and gets your heart pumping is exercise. Here are sometips:   Walk to do errands or to take your child to school or the bus.  Go for a family bike ride after dinner instead of watching TV.  Where can you learn more?  Go toUnomytps://[email or web address].[employer]. org and sign in to your [employer] account. Enter A644 in the Search HealthInformation box to learn more about \"A Healthy Lifestyle: Care Instructions. \"     If you do not have anaccount, please click on the \"Sign Up Now\" link.  Current as of: July 26, 2016   Content Version: 11.2   © 7731-7590 [email or web address]. Care instructions adapted under license by [facility] ([facility]). If you have questions about a medical condition or this instruction, always ask your healthcare professional. [employer] disclaims any warranty or liability for your use of this information.

## 2020-12-23 ENCOUNTER — HOSPITAL ENCOUNTER (OUTPATIENT)
Dept: MRI IMAGING | Age: 50
Discharge: HOME | End: 2020-12-25
Payer: COMMERCIAL

## 2020-12-23 ENCOUNTER — HOSPITAL ENCOUNTER (OUTPATIENT)
Dept: GENERAL RADIOLOGY | Age: 50
Discharge: HOME | End: 2020-12-25
Payer: COMMERCIAL

## 2020-12-23 ENCOUNTER — HOSPITAL ENCOUNTER (OUTPATIENT)
Dept: LAB | Age: 50
Discharge: HOME | End: 2020-12-23
Payer: COMMERCIAL

## 2020-12-23 LAB
HCT VFR BLD CALC: 47.5 % (ref 36.3–47.1)
HEMOGLOBIN: 14.7 G/DL (ref 11.9–15.1)
MCH RBC QN AUTO: 29.7 PG (ref 25.2–33.5)
MCHC RBC AUTO-ENTMCNC: 30.9 G/DL (ref 25.2–33.5)
MCV RBC AUTO: 96 FL (ref 82.6–102.9)
NRBC AUTOMATED: 0 PER 100 WBC
PDW BLD-RTO: 15.7 % (ref 11.8–14.4)
PLATELET # BLD: 299 K/UL (ref 138–453)
PMV BLD AUTO: 10.7 FL (ref 8.1–13.5)
RBC # BLD: 4.95 M/UL (ref 3.95–5.11)
RHEUMATOID FACTOR: <10 IU/ML
SEDIMENTATION RATE, ERYTHROCYTE: 16 MM (ref 0–30)
T. PALLIDUM, IGG: NONREACTIVE
TSH SERPL DL<=0.05 MIU/L-ACNC: 1.3 MIU/L (ref 0.3–5)
WBC # BLD: 10 K/UL (ref 3.5–11.3)

## 2020-12-23 PROCEDURE — 86431 RHEUMATOID FACTOR QUANT: CPT

## 2020-12-23 PROCEDURE — 85730 THROMBOPLASTIN TIME PARTIAL: CPT

## 2020-12-23 PROCEDURE — 86780 TREPONEMA PALLIDUM: CPT

## 2020-12-23 PROCEDURE — 85027 COMPLETE CBC AUTOMATED: CPT

## 2020-12-23 PROCEDURE — 6360000004 HC RX CONTRAST MEDICATION: Performed by: PSYCHIATRY & NEUROLOGY

## 2020-12-23 PROCEDURE — 85613 RUSSELL VIPER VENOM DILUTED: CPT

## 2020-12-23 PROCEDURE — 71046 X-RAY EXAM CHEST 2 VIEWS: CPT

## 2020-12-23 PROCEDURE — 36415 COLL VENOUS BLD VENIPUNCTURE: CPT

## 2020-12-23 PROCEDURE — 86147 CARDIOLIPIN ANTIBODY EA IG: CPT

## 2020-12-23 PROCEDURE — 86038 ANTINUCLEAR ANTIBODIES: CPT

## 2020-12-23 PROCEDURE — A9579 GAD-BASE MR CONTRAST NOS,1ML: HCPCS | Performed by: PSYCHIATRY & NEUROLOGY

## 2020-12-23 PROCEDURE — 85610 PROTHROMBIN TIME: CPT

## 2020-12-23 PROCEDURE — 86695 HERPES SIMPLEX TYPE 1 TEST: CPT

## 2020-12-23 PROCEDURE — 70553 MRI BRAIN STEM W/O & W/DYE: CPT

## 2020-12-23 PROCEDURE — 85651 RBC SED RATE NONAUTOMATED: CPT

## 2020-12-23 PROCEDURE — 86696 HERPES SIMPLEX TYPE 2 TEST: CPT

## 2020-12-23 PROCEDURE — 84443 ASSAY THYROID STIM HORMONE: CPT

## 2020-12-23 PROCEDURE — 86694 HERPES SIMPLEX NES ANTBDY: CPT

## 2020-12-23 RX ADMIN — GADOTERIDOL 15 ML: 279.3 INJECTION, SOLUTION INTRAVENOUS at 15:50

## 2020-12-24 LAB
ANTI-NUCLEAR ANTIBODY (ANA): NEGATIVE
HERPES SIMPLEX VIRUS 1 IGG: 5.29
HERPES SIMPLEX VIRUS 2 IGG: 3.15
HERPES TYPE 1/2 IGM COMBINED: 0.66

## 2020-12-28 LAB
ANTICARDIOLIPIN IGA ANTIBODY: 4.9 APU
ANTICARDIOLIPIN IGG ANTIBODY: 22 GPU
CARDIOLIPIN AB IGM: 43.5 MPU
DILUTE RUSSELL VIPER VENOM TIME: ABNORMAL
INR BLD: 1
LUPUS ANTICOAG: ABNORMAL
PARTIAL THROMBOPLASTIN TIME: 28.7 SEC (ref 20.5–30.5)
PROTHROMBIN TIME: 10 SEC (ref 9–12)

## 2021-01-21 ENCOUNTER — OFFICE VISIT (OUTPATIENT)
Dept: NEUROLOGY | Age: 51
End: 2021-01-21
Payer: COMMERCIAL

## 2021-01-21 VITALS
DIASTOLIC BLOOD PRESSURE: 80 MMHG | HEIGHT: 65 IN | OXYGEN SATURATION: 97 % | SYSTOLIC BLOOD PRESSURE: 126 MMHG | HEART RATE: 75 BPM | WEIGHT: 174 LBS | BODY MASS INDEX: 28.99 KG/M2

## 2021-01-21 DIAGNOSIS — F41.9 ANXIETY: ICD-10-CM

## 2021-01-21 DIAGNOSIS — M54.40 CHRONIC LOW BACK PAIN WITH SCIATICA, SCIATICA LATERALITY UNSPECIFIED, UNSPECIFIED BACK PAIN LATERALITY: ICD-10-CM

## 2021-01-21 DIAGNOSIS — F32.0 CURRENT MILD EPISODE OF MAJOR DEPRESSIVE DISORDER, UNSPECIFIED WHETHER RECURRENT (HCC): ICD-10-CM

## 2021-01-21 DIAGNOSIS — F17.200 SMOKER: ICD-10-CM

## 2021-01-21 DIAGNOSIS — B02.22 HZV (HERPES ZOSTER VIRUS) POST HERPETIC TRIGEMINAL NEURALGIA: Primary | ICD-10-CM

## 2021-01-21 DIAGNOSIS — G56.03 BILATERAL CARPAL TUNNEL SYNDROME: ICD-10-CM

## 2021-01-21 DIAGNOSIS — R89.4 POSITIVE TEST FOR HERPES SIMPLEX VIRUS (HSV) ANTIBODY: ICD-10-CM

## 2021-01-21 DIAGNOSIS — G50.0 TRIGEMINAL NEURALGIA OF RIGHT SIDE OF FACE: ICD-10-CM

## 2021-01-21 DIAGNOSIS — G89.29 CHRONIC BILATERAL LOW BACK PAIN WITHOUT SCIATICA: ICD-10-CM

## 2021-01-21 DIAGNOSIS — G47.9 SLEEP DIFFICULTIES: ICD-10-CM

## 2021-01-21 DIAGNOSIS — G25.81 RLS (RESTLESS LEGS SYNDROME): ICD-10-CM

## 2021-01-21 DIAGNOSIS — Z72.0 TOBACCO USE: ICD-10-CM

## 2021-01-21 DIAGNOSIS — G89.29 CHRONIC LOW BACK PAIN WITH SCIATICA, SCIATICA LATERALITY UNSPECIFIED, UNSPECIFIED BACK PAIN LATERALITY: ICD-10-CM

## 2021-01-21 DIAGNOSIS — R76.0 ANTICARDIOLIPIN ANTIBODY POSITIVE: ICD-10-CM

## 2021-01-21 DIAGNOSIS — M54.50 CHRONIC BILATERAL LOW BACK PAIN WITHOUT SCIATICA: ICD-10-CM

## 2021-01-21 DIAGNOSIS — G25.81 RESTLESS LEG SYNDROME: ICD-10-CM

## 2021-01-21 PROCEDURE — 99215 OFFICE O/P EST HI 40 MIN: CPT | Performed by: PSYCHIATRY & NEUROLOGY

## 2021-01-21 RX ORDER — SUMATRIPTAN 100 MG/1
100 TABLET, FILM COATED ORAL
Qty: 12 TABLET | Refills: 2 | Status: SHIPPED | OUTPATIENT
Start: 2021-01-21 | End: 2021-03-15 | Stop reason: SDUPTHER

## 2021-01-21 RX ORDER — ACYCLOVIR 800 MG/1
800 TABLET ORAL 3 TIMES DAILY
Qty: 90 TABLET | Refills: 2 | Status: SHIPPED | OUTPATIENT
Start: 2021-01-21

## 2021-01-21 RX ORDER — GABAPENTIN 300 MG/1
300 CAPSULE ORAL 3 TIMES DAILY
Qty: 90 CAPSULE | Refills: 1 | Status: SHIPPED | OUTPATIENT
Start: 2021-01-21 | End: 2021-03-15

## 2021-01-21 RX ORDER — ESCITALOPRAM OXALATE 5 MG/1
5 TABLET ORAL DAILY
COMMUNITY

## 2021-01-21 RX ORDER — BUSPIRONE HYDROCHLORIDE 5 MG/1
5 TABLET ORAL 3 TIMES DAILY
COMMUNITY

## 2021-01-21 RX ORDER — TOPIRAMATE 100 MG/1
TABLET, FILM COATED ORAL
Qty: 60 TABLET | Refills: 2 | Status: SHIPPED | OUTPATIENT
Start: 2021-01-21 | End: 2021-03-15 | Stop reason: SDUPTHER

## 2021-01-21 ASSESSMENT — ENCOUNTER SYMPTOMS
ABDOMINAL PAIN: 0
COUGH: 0
EYE PAIN: 0
CHEST TIGHTNESS: 0
EYE DISCHARGE: 0
SHORTNESS OF BREATH: 0
SCALP TENDERNESS: 0
CONSTIPATION: 0
ABDOMINAL DISTENTION: 0
FACIAL SWELLING: 0
SWOLLEN GLANDS: 0
VISUAL CHANGE: 0
EYE ITCHING: 0
PHOTOPHOBIA: 1
EYE WATERING: 0
EYE REDNESS: 0
RHINORRHEA: 0
BACK PAIN: 0
BLURRED VISION: 0
APNEA: 0
CHOKING: 0
TROUBLE SWALLOWING: 0
VOICE CHANGE: 0
WHEEZING: 0
BOWEL INCONTINENCE: 0
DIARRHEA: 0
FACIAL SWEATING: 0
VOMITING: 0
NAUSEA: 0
SINUS PRESSURE: 0
COLOR CHANGE: 0
BLOOD IN STOOL: 0
SORE THROAT: 0

## 2021-01-21 NOTE — PROGRESS NOTES
[facility]  Neurology    [address] [address]  IQIOO:[phone]   Fax: [phone]        SUBJECTIVE:       PATIENT ID:  Brady Chand is a  RIGHT HANDED 45 y.o. female. Neurologic Problem  The patient's pertinent negatives include no altered mental status, clumsiness, focal sensory loss, focal weakness, loss of balance, memory loss, near-syncope, slurred speech, syncope, visual change or weakness. Primary symptoms comment: RIGHT  TRIGEMINAL  NEURALGIA   V 1   DIVISION . This is a new problem. Episode onset: SINCE  OCT. 2020   The neurological problem developed suddenly. The problem has been waxing and waning since onset. There was facial and right-sided focality noted. Associated symptoms include headaches. Pertinent negatives include no abdominal pain, auditory change, aura, back pain, bladder incontinence, bowel incontinence, chest pain, confusion, diaphoresis, dizziness, fatigue, fever, light-headedness, nausea, neck pain, palpitations, shortness of breath, vertigo or vomiting. Past treatments include nothing. The treatment provided no relief. Her past medical history is significant for mood changes. There is no history of a bleeding disorder, a clotting disorder, a CVA, dementia, head trauma, liver disease or seizures. Migraine   This is a chronic problem. Episode onset: SINCE  TEENAGE   The problem occurs intermittently. The problem has been waxing and waning. The pain is located in the right unilateral region. The pain does not radiate. The quality of the pain is described as aching, pulsating and stabbing. The pain is at a severity of 3/10. The pain is mild. Associated symptoms include phonophobia and photophobia.  Pertinent negatives include no abdominal pain, abnormal behavior, anorexia, back pain, blurred vision, coughing, dizziness, drainage, ear pain, eye pain, eye redness, eye watering, facial sweating, fever, hearing loss, insomnia, loss of balance, muscle aches, nausea, neck pain, numbness, rhinorrhea, scalp tenderness, seizures, sinus pressure, sore throat, swollen glands, tingling, tinnitus, visual change, vomiting, weakness or weight loss. The symptoms are aggravated by unknown. She has tried triptans (TOPAMAX) for the symptoms. The treatment provided moderate relief. Her past medical history is significant for migraine headaches. There is no history of cancer, cluster headaches, hypertension, immunosuppression, migraines in the family, obesity, pseudotumor cerebri, recent head traumas, sinus disease or TMJ. History obtained from  The patient     and other  available   medical  records   were  Also  reviewed. The  Duration,  Quality,  Severity,  Location,  Timing,  Context,  Modifying  Factors   Of   The   Chief   Complaint       And  Present  Illness  Was   Reviewed   In   Chronological   Manner. PATIENT'S  MAIN  CONCERNS INCLUDE :                       1)       H/O    SHARP    ELECTRIC  LIKE   SHOOTING PAIN                                     STARING      AROUND       RIGHT    EYE        AND                                        EXTENDING  TO  ADJACENT  SIDE OF  FACE                                        LASTING  FOR    30   MINUTES   ALMOST  DAILY     SINCE                                         SEPT. 2020                               -   TRIGEMINAL  NEURALGIA   IN   RIGHT  OPTHALMIC  DIVISION                             2)    HAD   ENT   EVALUATIONS  IN  OCT. 2020                                     CT     SINUSES    SHOWED   NO  ACUTE    PATHOLOGY                            3)    PATIENT     TRIED    WITH  TEGRETOL     IN   OCT.  2020                                      WHICH   CAUSED    ALLERGIC    REACTION    AND                                 SAME  WAS   DISCONTINUED                              4)   H/O     CHRONIC    RESTLESS  SYNDROME                               PATIENT TAKING     NEURONTIN    300   MG   TID                                         WHICH IS  HELPING                             5)     H/O    CHRONIC  MIGRAINE      SINCE  TEENAGE                             -   ON      TOPAMAX ,     IMITREX      AS  NEEDED                      6)      H/O   CHRONIC  ANXIETY    DEPRESSION                                         -   ON  BUSPAR  AND  LEXAPRO                             PATIENT  TO  FOLLOW  WITH   MENTAL  HEALTH   PROFESSIONALS                        7)        H/O    SKIN  RASHES    MACULOPAPULAR       OVER                               BOTH     UPPER  AND  LOWER  EXTREMITIES    SINCE  OCT 2020                                     -    RESOLVED                                 TO  FOLLOW  WITH     DERMATOLOGY  AND  HER   PCP                      8)     H/O    CARPAL  TUNNEL   SYNDROME  SURGERIES      IN  FEB. 2020                               9)     H/O     CHRONIC  SMOKING                    PATIENT  AWARE  OF  RISKS  AND                 SIDE  EFFECTS  OF   SMOKING   DISCUSSED. PATIENT   ADVISED   AND  COUNSELED    TO   QUIT  SMOKING.                                                                                     10)         MRI  BRAIN  WITH  AND  W/O   CONTRAST    IN   DEC.  2020                                            CHEST   X RAY     SHOWED                                                NO  SIGNIFICANT  ABNORMALITIES                            11)      LABS       IN   DEC.  2020    SHOWED :                                     A)    STRONGLY   POSITIVE    HSV I  AND  HSV II    Ig G                                                            And        Ig M   Within NORMAL  LIMITS                                      B)     POSITIVE   ANTICARDIOLIPIN      Ig  G    And    IgM                                       -     RESULTS  REVIEWED   WITH PATIENT   IN  DETAIL                                12)       CURRENTLY   PATIENT    FEELS     HER   TRIGEMINAL NEURALGIA                                       SYMPTOMS       AND    MIGRAINES      ARE     80  %    BETTER                                        -ON    TOPAMAX,    NEURONTIN                                     PATIENT  ADVISED  TO  CONTINUE  THE  SAME. 13)      PATIENT     ADVISED                                    A) TO   TAKE    ASA   DAILY  PRO PHYLACTICALLY                                      B)    QUIT  SMOKING                                                                               C)  AS   DISCUSSED    WITH  PATIENT                               PATIENT  WILL   STARTED  ON  LONG TERM   ACYCLOVIR                                               *    EXPECTATIONS,   GOALS   AND  SIDE  EFFECTS  MEDICATIONS    WERE                                 REVIEWED     AND   DISCUSSED    IN    DETAIL.                                                                                                                                              PRECIPITATING  FACTORS: including  fever/infection, exertion/relaxation, position change, stress,                weather change,   medications/alcohol, time of day/darkness/light  Are  absent                                                          MODIFYING  FACTORS:  fever/infection, exertion/relaxation, position change, stress, weather change,               medications/alcohol, time of day/darkness/light  Are  absent                Patient   Indicates   The  Presence   And  The  Absence  Of  The  Following    Associated  And             Additional  Neurological    Symptoms:                                Balance  And coordination   problems  absent           Gait problems     absent            Headaches      present              Migraines           present           Memory problemsabsent              Confusion        absent            Paresthesia   numbness          absent           Seizures  And  Starring  Episodes           absent           Syncope, Near  syncopal episodes         absent           Speech   problems           absent             Swallowing   Problems      absent            Dizziness,  Light headedness           absent              Vertigo        absent             Generalized   Weakness    absent              focal  Weakness     absent             Tremors         absent              Sleep  Problems     absent             History  Of   Recent  Head  Injury     absent             History  Of   Recent  TIA     absent             History  Of   Recent    Stroke     absent             Neck  Pain   and   Neck muscle  Spasms  absent               Radiating  down   And   Weakness           absent            Lower back   Pain  And     Spasms  absent              Radiating    Down   And   Weakness          absent                H/OFALLS        absent               History  Of   Visual  Symptoms    absent                  Associated   Diplopia       absent                                               Also   Additional   Symptoms   Present    As  Documented    In   The   detailed                  Review  Of  Systems   And    Please   Refer   To    Them for   Additional    Information. Any components  That are either  Unobtainable  Or  Limited  In   HPI, ROS  And/or PFSH   Are                   Due   ToPatient's  Medical  Problems,  Clinical  Condition   and/or lack of                                 other    Alternate   resources.             RECORDS   REVIEWED:    historical medical records           INFORMATION   REVIEWED:     MEDICAL   HISTORY,SURGICAL   HISTORY,     MEDICATIONS   LIST,   ALLERGIES AND  DRUG  INTOLERANCES,       FAMILY   HISTORY,  SOCIAL  HISTORY,      PROBLEM  LIST   FOR  PATIENT  CARE   COORDINATION          Past Medical History:   Diagnosis Date    Hypertension     Migraine     Panic attack          Past Surgical History:   Procedure Laterality Date    COLON SURGERY  1060    complication after hyst    HYSTERECTOMY, TOTAL ABDOMINAL  2004         Current Outpatient Medications   Medication Sig Dispense Refill    busPIRone (BUSPAR) 5 MG tablet Take 5 mg by mouth 3 times daily      escitalopram (LEXAPRO) 5 MG tablet Take 5 mg by mouth daily      SUMAtriptan (IMITREX) 100 MG tablet Take 1 tablet by mouth once as needed for Migraine 12 tablet 2    topiramate (TOPAMAX) 100 MG tablet ONE  TABLET  TWICE  DAILY 60 tablet 2    gabapentin (NEURONTIN) 300 MG capsule Take 1 capsule by mouth 3 times daily for 31 days. 90 capsule 1    acyclovir (ZOVIRAX) 800 MG tablet Take 1 tablet by mouth 3 times daily 90 tablet 2    tolterodine (DETROL LA) 2 MG extended release capsule Take 1 capsule by mouth daily (Patient not taking: Reported on 12/9/2020) 30 capsule 3    meloxicam (MOBIC) 15 MG tablet Take 1 tablet by mouth daily (Patient not taking: Reported on 12/9/2020) 30 tablet 3     No current facility-administered medications for this visit. Allergies   Allergen Reactions    Carbamazepine Hives    Methylprednisolone     Benadryl [Diphenhydramine] Other (See Comments)     Pain in legs, makes her restless leg syndrome worse. History reviewed. No pertinent family history.       Social History     Socioeconomic History    Marital status:      Spouse name: Not on file    Number of children: Not on file    Years of education: Not on file    Highest education level: Not on file   Occupational History    Not on file   Social Needs    Financial resource strain: Not on file    Food insecurity     Worry: Not on file     Inability: Not on file    Transportation needs     Medical: Not on file     Non-medical: Not on file   Tobacco Use    Smoking status: Current Every Day Smoker     Packs/day: 0.25     Years: 33.00     Pack years: 8.25     Types: Cigarettes    Smokeless tobacco: Never Used    Tobacco comment: 4 cigs daily   Substance and Sexual Activity    Alcohol use: No    Drug use: No    Sexual activity: Not Currently     Partners: Male     Comment: due to pain x 2 years   Lifestyle    Physical activity     Days per week: Not on file     Minutes per session: Not on file    Stress: Not on file   Relationships    Social connections     Talks on phone: Not on file     Gets together: Not on file     Attends Latter day service: Not on file     Active member of club or organization: Not on file     Attends meetings of clubs or organizations: Not on file     Relationship status: Not on file    Intimate partner violence     Fear of current or ex partner: Not on file     Emotionally abused: Not on file     Physically abused: Not on file     Forced sexual activity: Not on file   Other Topics Concern    Not on file   Social History Narrative    Not on file       Vitals:    01/21/21 1537   BP: 126/80   Pulse: 75   SpO2: 97%         Wt Readings from Last 3 Encounters:   01/21/21 174 lb (78.9 kg)   12/09/20 175 lb 12.8 oz (79.7 kg)   03/11/20 210 lb (95.3 kg)         BP Readings from Last 3 Encounters:   01/21/21 126/80   12/09/20 122/82   03/11/20 118/82           Hematology and Coagulation  Lab Results   Component Value Date    WBC 10.0 12/23/2020    RBC 4.95 12/23/2020    HGB 14.7 12/23/2020    HCT 47.5 12/23/2020    MCV 96.0 12/23/2020    MCH 29.7 12/23/2020    MCHC 30.9 12/23/2020    RDW 15.7 12/23/2020     12/23/2020    MPV 10.7 12/23/2020     Chemistries  Lab Results   Component Value Date     05/14/2018    K 3.7 05/14/2018     05/14/2018    CO2 27 05/14/2018    BUN 10 05/14/2018    CREATININE 0.57 05/14/2018    CALCIUM 9.6 05/14/2018    PROT 6.7 04/15/2018    LABALBU 3.7 04/15/2018    BILITOT 0.20 04/15/2018    ALKPHOS 91 04/15/2018    AST 10 04/15/2018    ALT 18 04/15/2018     Lab Results   Component Value Date    ALKPHOS 91 04/15/2018    ALT 18 04/15/2018    AST 10 04/15/2018    PROT 6.7 04/15/2018    BILITOT 0.20 04/15/2018    BILIDIR <0.08 04/15/2018    LABALBU 3.7 04/15/2018     Lab Results   Component Value Date    BUN 10 05/14/2018    CREATININE 0.57 05/14/2018     Lab Results   Component Value Date    CALCIUM 9.6 05/14/2018     Lab Results   Component Value Date    AST 10 04/15/2018    ALT 18 04/15/2018         Review of Systems   Constitutional: Negative for appetite change, chills, diaphoresis, fatigue, fever, unexpected weight change and weight loss. HENT: Negative for congestion, dental problem, drooling, ear discharge, ear pain, facial swelling, hearing loss, mouth sores, nosebleeds, postnasal drip, rhinorrhea, sinus pressure, sore throat, tinnitus, trouble swallowing and voice change. Eyes: Positive for photophobia. Negative for blurred vision, pain, discharge, redness, itching and visual disturbance. Respiratory: Negative for apnea, cough, choking, chest tightness, shortness of breath and wheezing. Cardiovascular: Negative for chest pain, palpitations, leg swelling and near-syncope. Gastrointestinal: Negative for abdominal distention, abdominal pain, anorexia, blood in stool, bowel incontinence, constipation, diarrhea, nausea and vomiting. Endocrine: Negative for cold intolerance, heat intolerance, polydipsia, polyphagia and polyuria. Genitourinary: Negative for bladder incontinence. Musculoskeletal: Negative for arthralgias, back pain, gait problem, joint swelling, myalgias, neck pain and neck stiffness. Skin: Negative for color change, pallor, rash and wound. Allergic/Immunologic: Negative for environmental allergies, food allergies and immunocompromised state. Neurological: Positive for headaches. Negative for dizziness, vertigo, tingling, tremors, focal weakness, seizures, syncope, facial asymmetry, speech difficulty, weakness, light-headedness, numbness and loss of balance. Hematological: Negative for adenopathy. Does not bruise/bleed easily. Psychiatric/Behavioral: Positive for decreased concentration.  Negative for agitation, behavioral problems, confusion, dysphoric mood, hallucinations, memory loss, self-injury, sleep disturbance and suicidal ideas. The patient is nervous/anxious. The patient does not have insomnia and is not hyperactive. OBJECTIVE:    Physical Exam  Constitutional:       Appearance: She is well-developed. HENT:      Head: Normocephalic and atraumatic. No raccoon eyes or Lima's sign. Right Ear: External ear normal.      Left Ear: External ear normal.      Nose: Nose normal.   Eyes:      Conjunctiva/sclera: Conjunctivae normal.      Pupils: Pupils are equal, round, and reactive to light. Neck:      Musculoskeletal: Normal range of motion and neck supple. Normal range of motion. No neck rigidity or muscular tenderness. Thyroid: No thyroid mass or thyromegaly. Vascular: No carotid bruit. Trachea: No tracheal deviation. Meningeal: Brudzinski's sign and Kernig's sign absent. Cardiovascular:      Rate and Rhythm: Normal rate and regular rhythm. Pulmonary:      Effort: Pulmonary effort is normal.   Musculoskeletal:         General: No tenderness. Skin:     General: Skin is warm. Coloration: Skin is not pale. Findings: No erythema or rash. Nails: There is no clubbing. Psychiatric:         Attention and Perception: She is attentive. Mood and Affect: Mood is not anxious or depressed. Affect is not labile, blunt or inappropriate. Speech: She is communicative. Speech is not rapid and pressured, delayed, slurred or tangential.         Behavior: Behavior is not agitated, slowed, aggressive, withdrawn, hyperactive or combative. Behavior is cooperative. Thought Content: Thought content is not paranoid or delusional. Thought content does not include homicidal or suicidal ideation. Thought content does not include homicidal or suicidal plan. Cognition and Memory: Memory is not impaired. She does not exhibit impaired recent memory or impaired remote memory. Judgment: Judgment is not impulsive or inappropriate. NEUROLOGICALEXAMINATION :       A) MENTAL STATUS:                   Alert and  oriented  To time, place  And  Person. No Aphasia. No  Dysarthria. Able   To  Follow     SIMPLE    commands   without   Any  Difficulty. No right  To left confusion. Normal  Speech  And language function. Insight and  Judgment ,Fund  Of  Knowledge   within normal limits                Recent  And  Remote memory  within   normal limits                Attention &  Concentration are within   normal limits                                                   B) CRANIAL NERVES :        CN : Visual  Acuity  And  Visual fields  within normal limits               Fundi  Could  Not  Be  Could  Not  Be  Evaluated. 3,4,6 CN : Both  Pupils are   Reactive and  Equal.  Movements  Are  Intact. No  Nystagmus. No  MAUDE. No  Afferent  Pupillary  Defect noted. 5 CN :  Normal  Facial sensations and Corneal  Reflexes           7 CN:  Normal  Facial  Symmetry  And  Strength. No facial  Weakness. 8 CN :  Hearing  Appears within normal limits          9, 10 CN: Normal   spontaneous, reflex   palate   movements         11 CN:   Normal  Shoulder  shrug and  strength         12 CN :   Normal  Tongue movements and  Tongue  In midline                        No tongue   Fasciculations or atrophy       C) MOTOR  EXAM:                 Strength  In upper  And  Lower   extremities   within   normal limits               No  Drift. No  Atrophy               Rapid   alternating  And  repetitions  Movements  within   normal limits                 Muscle  Tone  In upper  And  Lower  Extremities  normal                No rigidity. No  Spasticity. Bradykinesia   absent                 No  Asterixis.               Sustention  Tremor , Resting Tremor   absent                    No   other  Abnormal  Movements noted           D) SENSORY :               Light   touch, pinprick,   position  And  Vibration  within normal limits        E) REFLEXES:                   Deep  Tendon  Reflexes   normal                  No  pathological  Reflexes  Bilaterally. F) COORDINATION  AND  GAIT :                                Station and  Gait  normal                              Romberg 's test   POSITIVE                            Ataxia negative      ASSESSMENT:      Patient Active Problem List   Diagnosis    Tobacco use    Chronic bilateral low back pain without sciatica    Restless leg syndrome    Anxiety    Chronic migraine    Depression    RLS (restless legs syndrome)    Sleep difficulties    Smoker    Chronic low back pain with sciatica    Skin rash    Bilateral carpal tunnel syndrome    Trigeminal neuralgia of right side of face    Anticardiolipin antibody positive    Positive test for herpes simplex virus (HSV) antibody    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       COMPARISON:   CT head February 14, 2017       HISTORY:   ORDERING SYSTEM PROVIDED HISTORY: Chronic bilateral low back pain without   sciatica   TECHNOLOGIST PROVIDED HISTORY:   Is the patient pregnant?->No   Reason for Exam:  Nerve pain right side of face for two and a half months. Acuity: Acute   Type of Exam: Initial   Additional signs and symptoms: Trigeminal neuralgia of right side of face   Relevant Medical/Surgical History: Patient has earrings in that she refuses   to remove.  Patient stated \" she can not take them out, they are permanent\"       FINDINGS:   INTRACRANIAL STRUCTURES/VENTRICLES: There are a few Chronic bilateral low back pain without sciatica  M54.5     G89.29    6. Trigeminal neuralgia of right side of face  G50.0    7. Sleep difficulties  G47.9    8. Restless leg syndrome  G25.81    9. Anxiety  F41.9    10. Smoker  F17.200    11. Chronic migraine  G43.709    12. Current mild episode of major depressive disorder, unspecified whether recurrent ([address]  F32.0    13. Tobacco use  Z72.0    14. Anticardiolipin antibody positive  R76.0    15. Positive test for herpes simplex virus (HSV) antibody  R89.4               CONCERNS   &   INCREASED   RISK   FOR            *    COMPLICATED  MIGRAINES       *    NEUROPATHIC  PAIN       *   POST  HERPETIC  NEURALGIA                    VARIOUS  RISK   FACTORS   WERE  REVIEWED   AND   DISCUSSED. PLAN:                         Manjinder Malone  Of  The  Diagnoses,  The  Management & Treatment  Options            AND    Care  plan  Were          Reviewed and   Discussed   With  patient. * Goals  And  Expectations  Of  The  Therapy  Discussed   And  Reviewed. *   Benefits   And   Side  Effect  Profile  Of  Medication/s   Were   Discussed                * Need   For  Further   Follow up For  The  Various  Problems Were  discussed. * Results  Of  The  Previous  Diagnostic tests were reviewed and  discussed                   Medical  Decision  Making  Was  Made  Based on the   Complexity  Of  Above  Mentioned  Diagnoses,    Data reviewed             And    Risk  Of  Significant   Co morbidities and   complicating   Factors. Medical  Decision  Was   High     Complexity   Due   To  The  Patient's  Multiple  Symptoms,  Advancing   Disease,            Complex  Treatment  Regimen,  Multiple medications           and   Risk  Of   Side  Effects,  Difficulty  In  Medication  Management  And  Diagnostic  Challenges           In  View  Of  The  Associated   Co  Morbid  Conditions   And  Problems.                                *   ADEQUATE   FLUID INTAKE   AND  ELECTROLYTE  BALANCE           * KEEP  DAIRY  OF   THE  NEUROLOGICAL  SYMPTOMS        RECORDING THE    DURATION  AND  FREQUENCY. *  AVOID    CONDITIONS  AND  FACTORS   THAT  MAKE                  NEUROLOGICAL  SYMPTOMS  WORSE.                    *TO  MAINTAIN  REGULAR  SLEEP  WAKE  CYCLES. *   TO  HAVE  ADEQUATE  REST  AND   SLEEP    HOURS.              *    AVOID  ANY USAGE OF    TOBACCO,          AVOID  EXCESSIVE  ALCOHOL  AND   ILLEGAL   SUBSTANCES          *  CONTINUE   MEDICATIONS    PRESCRIBED       AS    RECOMMENDED       *   Compliance   With  Medications   And  Instructions          *    MIGRAINE/ HEADACHE    DAIRY   WITH  MONITORING                       OF  DURATION  AND  FREQUENCY.             *    Antiplatelet  therapy    As   Recommended  Was   Discussed      *    Prophylactic  Use   Of     Vitamin   B   Complex,  Folic  Acid,    Vitamin  B12    Multivitamin,       Calcium  With  magnesium  And  Vit D    Supplementations   Over  The  Counter  Discussed              *  EVALUATIONS  AND  FOLLOW UP:                   *   OPTHALMOLOGY                             * ENT                *   DERMATOLOGY                                                      *       MRI  BRAIN  WITH  AND  W/O   CONTRAST    IN   DEC.  2020                                            CHEST   X RAY     SHOWED                                                NO  SIGNIFICANT  ABNORMALITIES                           *      LABS       IN   DEC.  2020    SHOWED :                                     A)    STRONGLY   POSITIVE    HSV I  AND  HSV II    Ig G                                                            And        Ig M   Within NORMAL  LIMITS                                      B)     POSITIVE   ANTICARDIOLIPIN      Ig  G    And    IgM                                       -     RESULTS  REVIEWED   WITH PATIENT   IN  DETAIL                                *     CURRENTLY   PATIENT    FEELS     HER TRIGEMINAL   NEURALGIA                                       SYMPTOMS       AND    MIGRAINES      ARE     80  %    BETTER                                        -ON    TOPAMAX,    NEURONTIN                                     PATIENT  ADVISED  TO  CONTINUE  THE  SAME. *     PATIENT     ADVISED                                    A) TO   TAKE    ASA   DAILY  PRO PHYLACTICALLY                                      B)    QUIT  SMOKING                                                                               C)  AS   DISCUSSED    WITH  PATIENT                               PATIENT  WILL   STARTED  ON  LONG TERM   ACYCLOVIR                                               *    EXPECTATIONS,   GOALS   AND  SIDE  EFFECTS  MEDICATIONS    WERE                                 REVIEWED     AND   DISCUSSED    IN    DETAIL. Controlled Substances Monitoring: Periodic Controlled Substance Monitoring: Possible medication side effects, risk of tolerance/dependence & alternative treatments discussed. , Assessed functional status. Carito Rodriguez MD)            Orders Placed This Encounter   Medications    SUMAtriptan (IMITREX) 100 MG tablet     Sig: Take 1 tablet by mouth once as needed for Migraine     Dispense:  12 tablet     Refill:  2    topiramate (TOPAMAX) 100 MG tablet     Sig: ONE  TABLET  TWICE  DAILY     Dispense:  60 tablet     Refill:  2    gabapentin (NEURONTIN) 300 MG capsule     Sig: Take 1 capsule by mouth 3 times daily for 31 days.      Dispense:  90 capsule     Refill:  1    acyclovir (ZOVIRAX) 800 MG tablet     Sig: Take 1 tablet by mouth 3 times daily     Dispense:  90 tablet     Refill:  2                     *PATIENT   TO  FOLLOW  UP  WITH   PRIMARY  CARE         OTHER  CONSULTANTS  AS  BEFORE.               *TO  FOLLOW  WITH   MENTAL  HEALTH  PROFESSIONALS ,  INCLUDING            PSYCHOLOGICAL  COUNSELING   AND  PSYCHIATRIC  EVALUTIONS,                     * Maintain   Healthy  Life Style    With   Periodic  Monitoring  Of          Any  Medical  Conditions  Including   Elevated  Blood  Pressure,  Lipid  Profile,        Blood  Sugar levels  AndHeart  Disease. *   Period   Screening  For  Cancers  Involving  Breast,  Colon,         Lungs  And  Other  Organs  As  Applicable,  In consultation   With  Your  Primary Care Providers. *Second  Neurological  Opinion  And  Evaluations  In  [facility]  Setting  If  Patient  Is  Interested. * Please   Contact   Neurology  Clinic   Early   If   Are  Any  New  Neurological   And  Any neurological  Concerns. *  If  The  Patient remains  Neurologically  Stable   Return   To  [facility] Neurology Department   IN    1-2      MONTHS  TIME   FOR  FURTHER              FOLLOW UP.                       *   The  Neurological   Findings,  Possible  Diagnosis,  Differential diagnoses                    And  Options  For    Further   Investigations                   And  management   Are  Discussed  Comprehensively. Medications   And  Prescription   Risks  And  Side effects  Are   Also  Discussed. *  If   There is  Any  Significant  Worsening   Of  Current  Symptoms  And  Or                  If patient  Develops   Any additional  New  NeurologicalSymptoms                  Or  Significant  Concerns   Should  Call  911 or  Go  To  Emergency  Department  For  Further  Immediate  Evaluation. The   Above  Were  Reviewed  With  patient   and                       questions  Answered  In  Detail. More   Than  50% of face  To face Time   Was  Spent  On  Counseling                    And   Coordination  Of  Care   Of   Patient's  multiple   Neurological  Problems                         And   Comorbid  Medical   Conditions.             Electronically signed by   Jennifer Vazquez Med Gaspar M.D., [facility]. Board Certified in  Neurology &  In  [facility] of Psychiatry and Neurology ([facility])      DISCLAIMER:   Although every effort was made to ensure the accuracy of this  electronictranscription, some errors in transcription may have occurred. GENERAL PATIENT INSTRUCTIONS:      A Healthy Lifestyle: Care Instructions   Your Care Instructions   A healthy lifestyle can help you feel good, stay at ahealthy weight, and have plenty of energy for both work and play. A healthy lifestyle is something you can share with your whole family.  A healthy lifestyle also can lower your risk for serious health problems, such ashigh blood pressure, heart disease, and diabetes.  You can follow a few steps listed below to improve your health and the health of your family.  Follow-up careis a key part of your treatment and safety. Be sure to make and go to all appointments, and call your doctor if you are having problems. Its also a good idea to know your test results and keep a list of the medicines you take.  How can you care for yourself at home?  Do not eat too much sugar, fat, or fast foods. You can still have dessert and treats nowand then. The goal is moderation.  Start small to improve your eating habits. Pay attention to portion sizes, drink less juice and soda pop, and eat more fruits and vegetables.  Eat a healthy amount of food. A 3-ounce serving of meat, for example, is about the size of a deck of cards. Fill the rest of your plate with vegetables and whole grains.  Limit theamount of soda and sports drinks you have every day. Drink more water when you are thirsty.  Eat at least 5 servings of fruits and vegetables every day. It may seem like a lot, but it is not hard to reach this goal. Aserving or helping is 1 piece of fruit, 1 cup of vegetables, or 2 cups of leafy, raw vegetables.  Have an apple or some carrot sticks as an afternoon snack instead of a candy bar. Try to have fruits and/or vegetables at everymeal.   Make exercise part of your daily routine. You may want to start with simple activities, such as walking, bicycling, or slow swimming. Try deirdre active 30 to 60 minutes every day. You do not need to do all 30 to 60 minutes all at once. For example, you can exercise 3 times a day for 10 or 20 minutes. Moderate exercise is safe for most people, but it is always agood idea to talk to your doctor before starting an exercise program.   Keep moving. Maye Le the lawn, work in the garden, or [employer]. Take the stairs instead of the elevator at work.  If you smoke, quit. Peoplewho smoke have an increased risk for heart attack, stroke, cancer, and other lung illnesses. Quitting is hard, but there are ways to boost your chance of quitting tobacco for good.  Use nicotine gum, patches, or lozenges.  Ask your doctor about stop-smoking programs and medicines.  Keep trying.  In addition to reducing your risk of diseases in the future, you will notice some benefits soon after you stop using tobacco. If you have shortness of breath or asthma symptoms, they will likely getbetter within a few weeks after you quit.  Limit how much alcohol you drink. Moderate amounts of alcohol (up to 2 drinks a day for men, 1drink a day for women) are okay. But drinking too much can lead to liver problems, high blood pressure, and other health problems.  health   If you have a family, there are many things you can do together to improve your health.  Eat meals together as a family as often as possible.  Eat healthy foods. This includes fruits, vegetables, lean meats and dairy, and whole grains.  Include your family in your fitness plan. Most peoplethink of activities such as jogging or tennis as the way to fitness, but there are many ways you and your family can be more active. Anything that makes you breathe hard and gets your heart pumping is exercise.  Here are sometips:   Walk to do errands or to take your child to school or the bus.  Go for a family bike ride after dinner instead of watching TV.  Where can you learn more?  Go toNokoritps://lisa.[employer]. org and sign in to your [employer] account. Enter B923 in the Search HealthInformation box to learn more about \"A Healthy Lifestyle: Care Instructions. \"     If you do not have anaccount, please click on the \"Sign Up Now\" link.  Current as of: July 26, 2016   Content Version: 11.2   © 4219-6831 [employer]. Care instructions adapted under license by [facility] ([facility]). If you have questions about a medical condition or this instruction, always ask your healthcare professional. [employer] disclaims any warranty or liability for your use of this information.

## 2021-03-15 ENCOUNTER — OFFICE VISIT (OUTPATIENT)
Dept: NEUROLOGY | Age: 51
End: 2021-03-15
Payer: COMMERCIAL

## 2021-03-15 VITALS
OXYGEN SATURATION: 99 % | HEIGHT: 65 IN | SYSTOLIC BLOOD PRESSURE: 122 MMHG | BODY MASS INDEX: 28.99 KG/M2 | DIASTOLIC BLOOD PRESSURE: 78 MMHG | WEIGHT: 174 LBS | HEART RATE: 82 BPM

## 2021-03-15 DIAGNOSIS — B02.22 HZV (HERPES ZOSTER VIRUS) POST HERPETIC TRIGEMINAL NEURALGIA: Primary | ICD-10-CM

## 2021-03-15 DIAGNOSIS — G56.03 BILATERAL CARPAL TUNNEL SYNDROME: ICD-10-CM

## 2021-03-15 DIAGNOSIS — Z72.0 TOBACCO USE: ICD-10-CM

## 2021-03-15 DIAGNOSIS — R76.0 ANTICARDIOLIPIN ANTIBODY POSITIVE: ICD-10-CM

## 2021-03-15 DIAGNOSIS — F17.200 SMOKER: ICD-10-CM

## 2021-03-15 DIAGNOSIS — G50.0 TRIGEMINAL NEURALGIA OF RIGHT SIDE OF FACE: ICD-10-CM

## 2021-03-15 DIAGNOSIS — G47.9 SLEEP DIFFICULTIES: ICD-10-CM

## 2021-03-15 DIAGNOSIS — M54.40 CHRONIC LOW BACK PAIN WITH SCIATICA, SCIATICA LATERALITY UNSPECIFIED, UNSPECIFIED BACK PAIN LATERALITY: ICD-10-CM

## 2021-03-15 DIAGNOSIS — G25.81 RESTLESS LEG SYNDROME: ICD-10-CM

## 2021-03-15 DIAGNOSIS — M54.50 CHRONIC BILATERAL LOW BACK PAIN WITHOUT SCIATICA: ICD-10-CM

## 2021-03-15 DIAGNOSIS — G44.201 ACUTE INTRACTABLE TENSION-TYPE HEADACHE: ICD-10-CM

## 2021-03-15 DIAGNOSIS — G89.29 CHRONIC LOW BACK PAIN WITH SCIATICA, SCIATICA LATERALITY UNSPECIFIED, UNSPECIFIED BACK PAIN LATERALITY: ICD-10-CM

## 2021-03-15 DIAGNOSIS — G89.29 CHRONIC BILATERAL LOW BACK PAIN WITHOUT SCIATICA: ICD-10-CM

## 2021-03-15 DIAGNOSIS — G25.81 RLS (RESTLESS LEGS SYNDROME): ICD-10-CM

## 2021-03-15 DIAGNOSIS — F41.9 ANXIETY: ICD-10-CM

## 2021-03-15 DIAGNOSIS — F32.0 CURRENT MILD EPISODE OF MAJOR DEPRESSIVE DISORDER, UNSPECIFIED WHETHER RECURRENT (HCC): ICD-10-CM

## 2021-03-15 DIAGNOSIS — R89.4 POSITIVE TEST FOR HERPES SIMPLEX VIRUS (HSV) ANTIBODY: ICD-10-CM

## 2021-03-15 PROCEDURE — 99214 OFFICE O/P EST MOD 30 MIN: CPT | Performed by: PSYCHIATRY & NEUROLOGY

## 2021-03-15 RX ORDER — BUTALBITAL, ACETAMINOPHEN AND CAFFEINE 50; 325; 40 MG/1; MG/1; MG/1
TABLET ORAL
Qty: 90 TABLET | Refills: 1 | Status: SHIPPED | OUTPATIENT
Start: 2021-03-15

## 2021-03-15 RX ORDER — TOPIRAMATE 100 MG/1
TABLET, FILM COATED ORAL
Qty: 60 TABLET | Refills: 2 | Status: SHIPPED | OUTPATIENT
Start: 2021-03-15 | End: 2021-06-02

## 2021-03-15 RX ORDER — SUMATRIPTAN 100 MG/1
100 TABLET, FILM COATED ORAL
Qty: 12 TABLET | Refills: 2 | Status: SHIPPED | OUTPATIENT
Start: 2021-03-15 | End: 2021-03-15

## 2021-03-15 ASSESSMENT — ENCOUNTER SYMPTOMS
COLOR CHANGE: 0
SINUS PRESSURE: 0
EYE DISCHARGE: 0
CHEST TIGHTNESS: 0
SHORTNESS OF BREATH: 0
EYE REDNESS: 0
RHINORRHEA: 0
PHOTOPHOBIA: 1
BLOOD IN STOOL: 0
EYE ITCHING: 0
APNEA: 0
FACIAL SWEATING: 0
SCALP TENDERNESS: 0
WHEEZING: 0
FACIAL SWELLING: 0
TROUBLE SWALLOWING: 0
DIARRHEA: 0
BOWEL INCONTINENCE: 0
VOICE CHANGE: 0
CONSTIPATION: 0
EYE PAIN: 0
EYE WATERING: 0
BACK PAIN: 0
BLURRED VISION: 0
ABDOMINAL DISTENTION: 0
CHOKING: 0

## 2021-03-15 NOTE — PROGRESS NOTES
[facility]  Neurology    [address] [address]  IRZSN:[phone]   Fax: [phone]        SUBJECTIVE:       PATIENT ID:  Brady Chand is a  RIGHT HANDED 45 y.o. female. Neurologic Problem  The patient's pertinent negatives include no altered mental status, clumsiness, focal sensory loss, focal weakness, loss of balance, memory loss, near-syncope, slurred speech or syncope. Primary symptoms comment: RIGHT  TRIGEMINAL  NEURALGIA   V 1   DIVISION . This is a new problem. Episode onset: SINCE  OCT. 2020   The neurological problem developed suddenly. The problem has been waxing and waning since onset. There was facial and right-sided focality noted. Pertinent negatives include no auditory change, aura, back pain, bladder incontinence, bowel incontinence, confusion, dizziness, light-headedness, palpitations or shortness of breath. Past treatments include nothing. The treatment provided no relief. Her past medical history is significant for mood changes. There is no history of a bleeding disorder, a clotting disorder, a CVA, dementia, head trauma, liver disease or seizures. Migraine   This is a chronic problem. Episode onset: SINCE  TEENAGE   The problem occurs intermittently. The problem has been waxing and waning. The pain is located in the right unilateral region. The pain does not radiate. The quality of the pain is described as aching, pulsating and stabbing. The pain is at a severity of 3/10. The pain is mild. Associated symptoms include phonophobia and photophobia. Pertinent negatives include no abnormal behavior, back pain, blurred vision, dizziness, drainage, ear pain, eye pain, eye redness, eye watering, facial sweating, hearing loss, insomnia, loss of balance, muscle aches, rhinorrhea, scalp tenderness, seizures, sinus pressure, tingling, tinnitus or weight loss. The symptoms are aggravated by unknown. She has tried triptans (TOPAMAX) for the symptoms.  The CHRONIC  ANXIETY    DEPRESSION                                         -   ON  BUSPAR  AND  LEXAPRO                             PATIENT  TO  FOLLOW  WITH   MENTAL  HEALTH   PROFESSIONALS                        7)        H/O    SKIN  RASHES    MACULOPAPULAR       OVER                               BOTH     UPPER  AND  LOWER  EXTREMITIES    SINCE  OCT 2020                                     -    RESOLVED                                 TO  FOLLOW  WITH     DERMATOLOGY  AND  HER   PCP                      8)     H/O    CARPAL  TUNNEL   SYNDROME  SURGERIES      IN  FEB. 2020                               9)     H/O     CHRONIC  SMOKING                    PATIENT  AWARE  OF  RISKS  AND                 SIDE  EFFECTS  OF   SMOKING   DISCUSSED. PATIENT   ADVISED   AND  COUNSELED    TO   QUIT  SMOKING. 10)         MRI  BRAIN  WITH  AND  W/O   CONTRAST    IN   DEC.  2020                                            CHEST   X RAY     SHOWED                                                NO  SIGNIFICANT  ABNORMALITIES                            11)      LABS       IN   DEC.  2020    SHOWED :                                     A)    STRONGLY   POSITIVE    HSV I  AND  HSV II    Ig G                                                            And        Ig M   Within NORMAL  LIMITS                                      B)     POSITIVE   ANTICARDIOLIPIN      Ig  G    And    IgM                                       -     RESULTS  REVIEWED   WITH PATIENT   IN  DETAIL                                12)       PATIENT    INDICATED  IN   JAN. 2021        HER   TRIGEMINAL   NEURALGIA                                       SYMPTOMS       AND    MIGRAINES      ARE     80  %    BETTER                                        -ON    TOPAMAX,    NEURONTIN                                     PATIENT  ADVISED  TO  CONTINUE  THE  SAME. 13)      PATIENT     ADVISED                                    A) TO   TAKE    ASA   DAILY  PRO PHYLACTICALLY                                      B)    QUIT  SMOKING                                                                           C)      ALSO     STARTED  ON  LONG TERM   ACYCLOVIR                                             14)       H/O      FRONTAL  HEADACHE     FOR  LAST  TWO  DAYS                                      WITH  DIZZINESS   . NO   FEVER  . NO  NECK PAIN                                            IMITREX   DID  NOT  HELP. -  ACUTE  NON INTRACTABLE      TENSION  HEADACHE                                  PATIENT  WILL  BE  TRIED  WITH  FIORICET     AS  NEEDED                             15)        PATIENT  ALSO    HAS  LONG  HISTORY OF   ALLERGIES                                     ADVISED  TO  FOLLOW   WITH  HER  PCP                                     IF   THE    HEADACHES     DID  NOT    GET  BETTER,     PATIENT                                ADVISED  TO  GO  TO   [facility]  OR  ER.                                                                                                                                         PRECIPITATING  FACTORS: including  fever/infection, exertion/relaxation, position change, stress,                weather change,   medications/alcohol, time of day/darkness/light  Are  absent                                                          MODIFYING  FACTORS:  fever/infection, exertion/relaxation, position change, stress, weather change,               medications/alcohol, time of day/darkness/light  Are  absent                Patient   Indicates   The  Presence   And  The  Absence  Of  The  Following    Associated  And             Additional  Neurological    Symptoms:                                Balance  And coordination   problems  absent           Gait problems     absent            Headaches present              Migraines           present           Memory problemsabsent              Confusion        absent            Paresthesia   numbness          absent           Seizures  And  Starring  Episodes           absent           Syncope,  Near  syncopal episodes         absent           Speech   problems           absent             Swallowing   Problems      absent            Dizziness,  Light headedness           absent              Vertigo        absent             Generalized   Weakness    absent              focal  Weakness     absent             Tremors         absent              Sleep  Problems     absent             History  Of   Recent  Head  Injury     absent             History  Of   Recent  TIA     absent             History  Of   Recent    Stroke     absent             Neck  Pain   and   Neck muscle  Spasms  absent               Radiating  down   And   Weakness           absent            Lower back   Pain  And     Spasms  absent              Radiating    Down   And   Weakness          absent                H/OFALLS        absent               History  Of   Visual  Symptoms    absent                  Associated   Diplopia       absent                                               Also   Additional   Symptoms   Present    As  Documented    In   The   detailed                  Review  Of  Systems   And    Please   Refer   To    Them for   Additional    Information. Any components  That are either  Unobtainable  Or  Limited  In   HPI, ROS  And/or PFSH   Are                   Due   ToPatient's  Medical  Problems,  Clinical  Condition   and/or lack of                                 other    Alternate   resources.             RECORDS   REVIEWED:    historical medical records           INFORMATION   REVIEWED:     MEDICAL   HISTORY,SURGICAL   HISTORY,     MEDICATIONS   LIST,   ALLERGIES AND  DRUG  INTOLERANCES,       FAMILY   HISTORY,  SOCIAL  HISTORY,      PROBLEM  LIST   FOR PATIENT  CARE   COORDINATION          Past Medical History:   Diagnosis Date    Hypertension     Migraine     Panic attack          Past Surgical History:   Procedure Laterality Date    COLON SURGERY  2635    complication after hyst    HYSTERECTOMY, TOTAL ABDOMINAL  2004         Current Outpatient Medications   Medication Sig Dispense Refill    busPIRone (BUSPAR) 5 MG tablet Take 5 mg by mouth 3 times daily      escitalopram (LEXAPRO) 5 MG tablet Take 5 mg by mouth daily      SUMAtriptan (IMITREX) 100 MG tablet Take 1 tablet by mouth once as needed for Migraine 12 tablet 2    topiramate (TOPAMAX) 100 MG tablet ONE  TABLET  TWICE  DAILY 60 tablet 2    gabapentin (NEURONTIN) 300 MG capsule Take 1 capsule by mouth 3 times daily for 31 days. (Patient taking differently: Take 600 mg by mouth 3 times daily. ) 90 capsule 1    acyclovir (ZOVIRAX) 800 MG tablet Take 1 tablet by mouth 3 times daily 90 tablet 2    tolterodine (DETROL LA) 2 MG extended release capsule Take 1 capsule by mouth daily (Patient not taking: Reported on 12/9/2020) 30 capsule 3    meloxicam (MOBIC) 15 MG tablet Take 1 tablet by mouth daily (Patient not taking: Reported on 12/9/2020) 30 tablet 3     No current facility-administered medications for this visit. Allergies   Allergen Reactions    Carbamazepine Hives    Methylprednisolone     Benadryl [Diphenhydramine] Other (See Comments)     Pain in legs, makes her restless leg syndrome worse. History reviewed. No pertinent family history.       Social History     Socioeconomic History    Marital status:      Spouse name: Not on file    Number of children: Not on file    Years of education: Not on file    Highest education level: Not on file   Occupational History    Not on file   Social Needs    Financial resource strain: Not on file    Food insecurity     Worry: Not on file     Inability: Not on file    Transportation needs     Medical: Not on file Non-medical: Not on file   Tobacco Use    Smoking status: Current Every Day Smoker     Packs/day: 0.25     Years: 33.00     Pack years: 8.25     Types: Cigarettes    Smokeless tobacco: Never Used    Tobacco comment: 4 cigs daily   Substance and Sexual Activity    Alcohol use: No    Drug use: No    Sexual activity: Not Currently     Partners: Male     Comment: due to pain x 2 years   Lifestyle    Physical activity     Days per week: Not on file     Minutes per session: Not on file    Stress: Not on file   Relationships    Social connections     Talks on phone: Not on file     Gets together: Not on file     Attends Yazdanism service: Not on file     Active member of club or organization: Not on file     Attends meetings of clubs or organizations: Not on file     Relationship status: Not on file    Intimate partner violence     Fear of current or ex partner: Not on file     Emotionally abused: Not on file     Physically abused: Not on file     Forced sexual activity: Not on file   Other Topics Concern    Not on file   Social History Narrative    Not on file       Vitals:    03/15/21 1526   BP: 122/78   Pulse: 82   SpO2: 99%         Wt Readings from Last 3 Encounters:   03/15/21 174 lb (78.9 kg)   01/21/21 174 lb (78.9 kg)   12/09/20 175 lb 12.8 oz (79.7 kg)         BP Readings from Last 3 Encounters:   03/15/21 122/78   01/21/21 126/80   12/09/20 122/82           Hematology and Coagulation  Lab Results   Component Value Date    WBC 10.0 12/23/2020    RBC 4.95 12/23/2020    HGB 14.7 12/23/2020    HCT 47.5 12/23/2020    MCV 96.0 12/23/2020    MCH 29.7 12/23/2020    MCHC 30.9 12/23/2020    RDW 15.7 12/23/2020     12/23/2020    MPV 10.7 12/23/2020     Chemistries  Lab Results   Component Value Date     05/14/2018    K 3.7 05/14/2018     05/14/2018    CO2 27 05/14/2018    BUN 10 05/14/2018    CREATININE 0.57 05/14/2018    CALCIUM 9.6 05/14/2018    PROT 6.7 04/15/2018    LABALBU 3.7 04/15/2018 BILITOT 0.20 04/15/2018    ALKPHOS 91 04/15/2018    AST 10 04/15/2018    ALT 18 04/15/2018     Lab Results   Component Value Date    ALKPHOS 91 04/15/2018    ALT 18 04/15/2018    AST 10 04/15/2018    PROT 6.7 04/15/2018    BILITOT 0.20 04/15/2018    BILIDIR <0.08 04/15/2018    LABALBU 3.7 04/15/2018     Lab Results   Component Value Date    BUN 10 05/14/2018    CREATININE 0.57 05/14/2018     Lab Results   Component Value Date    CALCIUM 9.6 05/14/2018     Lab Results   Component Value Date    AST 10 04/15/2018    ALT 18 04/15/2018         Review of Systems   Constitutional: Negative for appetite change, unexpected weight change and weight loss. HENT: Negative for dental problem, drooling, ear discharge, ear pain, facial swelling, hearing loss, mouth sores, nosebleeds, postnasal drip, rhinorrhea, sinus pressure, tinnitus, trouble swallowing and voice change. Eyes: Positive for photophobia. Negative for blurred vision, pain, discharge, redness, itching and visual disturbance. Respiratory: Negative for apnea, choking, chest tightness, shortness of breath and wheezing. Cardiovascular: Negative for palpitations, leg swelling and near-syncope. Gastrointestinal: Negative for abdominal distention, blood in stool, bowel incontinence, constipation and diarrhea. Endocrine: Negative for cold intolerance, heat intolerance, polydipsia, polyphagia and polyuria. Genitourinary: Negative for bladder incontinence. Musculoskeletal: Negative for back pain, gait problem and neck stiffness. Skin: Negative for color change, pallor and wound. Allergic/Immunologic: Negative for environmental allergies, food allergies and immunocompromised state. Neurological: Negative for dizziness, tingling, tremors, focal weakness, seizures, syncope, facial asymmetry, speech difficulty, light-headedness and loss of balance. Hematological: Negative for adenopathy. Does not bruise/bleed easily.    Psychiatric/Behavioral: Positive for decreased concentration. Negative for agitation, behavioral problems, confusion, dysphoric mood, hallucinations, memory loss, self-injury, sleep disturbance and suicidal ideas. The patient is nervous/anxious. The patient does not have insomnia and is not hyperactive. OBJECTIVE:    Physical Exam  Constitutional:       Appearance: She is well-developed. HENT:      Head: Normocephalic and atraumatic. No raccoon eyes or Lima's sign. Right Ear: External ear normal.      Left Ear: External ear normal.      Nose: Nose normal.   Eyes:      Conjunctiva/sclera: Conjunctivae normal.      Pupils: Pupils are equal, round, and reactive to light. Neck:      Musculoskeletal: Normal range of motion and neck supple. Normal range of motion. No neck rigidity or muscular tenderness. Thyroid: No thyroid mass or thyromegaly. Vascular: No carotid bruit. Trachea: No tracheal deviation. Meningeal: Brudzinski's sign and Kernig's sign absent. Cardiovascular:      Rate and Rhythm: Normal rate and regular rhythm. Pulmonary:      Effort: Pulmonary effort is normal.   Musculoskeletal:         General: No tenderness. Skin:     General: Skin is warm. Coloration: Skin is not pale. Findings: No erythema or rash. Nails: There is no clubbing. Psychiatric:         Attention and Perception: She is attentive. Mood and Affect: Mood is not anxious or depressed. Affect is not labile, blunt or inappropriate. Speech: She is communicative. Speech is not rapid and pressured, delayed, slurred or tangential.         Behavior: Behavior is not agitated, slowed, aggressive, withdrawn, hyperactive or combative. Behavior is cooperative. Thought Content: Thought content is not paranoid or delusional. Thought content does not include homicidal or suicidal ideation. Thought content does not include homicidal or suicidal plan. Cognition and Memory: Memory is not impaired.     FINDINGS:   INTRACRANIAL STRUCTURES/VENTRICLES: There are a few scattered tiny foci of   T2/FLAIR hyperintensity in the periventricular and subcortical white matter,   although nonspecific, likely related to minimal chronic microvascular   disease.  There is no acute infarct. No mass effect or midline shift. No   evidence of an acute intracranial hemorrhage.  The ventricles and sulci are   normal in size and configuration.  The sellar/suprasellar regions appear   unremarkable. Hoa Spoon is crowding of foramen magnum.  The normal signal voids   within the major intracranial vessels appear maintained.       No abnormal focus of enhancement is seen within the brain.       The bilateral cerebellopontine angles, internal auditory canals and inner ear   structures are within normal limits.  The cavernous segments of the bilateral   trigeminal nerves and the bilateral Meckel's caves are within normal limits. No associated abnormal enhancement.       ORBITS: The visualized portion of the orbits demonstrate no acute abnormality.       SINUSES: The paranasal sinuses are grossly clear.  There is trace right   mastoid effusion.       BONES/SOFT TISSUES: The bone marrow signal intensity appears normal. The soft   tissues demonstrate no acute abnormality.           Impression   No acute intracranial abnormality.  No mass effect or abnormal intracranial   enhancement.       Minimal chronic microvascular disease.       The bilateral cerebellopontine angles, internal auditory canals and inner ear   structures are within normal limits. The cavernous segments of the bilateral   trigeminal nerves and the bilateral Meckel's caves are within normal limits.                 VISITING DIAGNOSIS:    No diagnosis found.            CONCERNS   &   INCREASED   RISK   FOR            *    COMPLICATED  MIGRAINES       *    NEUROPATHIC  PAIN       *   POST  HERPETIC  NEURALGIA                    VARIOUS  RISK   FACTORS   WERE  REVIEWED   AND DISCUSSED. PLAN:                         Francisco Ban  Of  The  Diagnoses,  The  Management & Treatment  Options            AND    Care  plan  Were          Reviewed and   Discussed   With  patient. * Goals  And  Expectations  Of  The  Therapy  Discussed   And  Reviewed. *   Benefits   And   Side  Effect  Profile  Of  Medication/s   Were   Discussed                * Need   For  Further   Follow up For  The  Various  Problems Were  discussed. * Results  Of  The  Previous  Diagnostic tests were reviewed and  discussed                   Medical  Decision  Making  Was  Made  Based on the   Complexity  Of  Above  Mentioned  Diagnoses,    Data reviewed             And    Risk  Of  Significant   Co morbidities and   complicating   Factors. Medical  Decision  Was   High     Complexity   Due   To  The  Patient's  Multiple  Symptoms,  Advancing   Disease,            Complex  Treatment  Regimen,  Multiple medications           and   Risk  Of   Side  Effects,  Difficulty  In  Medication  Management  And  Diagnostic  Challenges           In  View  Of  The  Associated   Co  Morbid  Conditions   And  Problems. *   ADEQUATE   FLUID  INTAKE   AND  ELECTROLYTE  BALANCE           * KEEP  DAIRY  OF   THE  NEUROLOGICAL  SYMPTOMS        RECORDING THE    DURATION  AND  FREQUENCY. *  AVOID    CONDITIONS  AND  FACTORS   THAT  MAKE                  NEUROLOGICAL  SYMPTOMS  WORSE.                    *TO  MAINTAIN  REGULAR  SLEEP  WAKE  CYCLES. *   TO  HAVE  ADEQUATE  REST  AND   SLEEP    HOURS.              *    AVOID  ANY USAGE OF    TOBACCO,          AVOID  EXCESSIVE  ALCOHOL  AND   ILLEGAL   SUBSTANCES          *  CONTINUE   MEDICATIONS    PRESCRIBED       AS    RECOMMENDED       *   Compliance   With  Medications   And  Instructions          *    MIGRAINE/ HEADACHE    DAIRY   WITH  MONITORING                       OF  DURATION  AND  FREQUENCY. *    Antiplatelet  therapy    As   Recommended  Was   Discussed      *    Prophylactic  Use   Of     Vitamin   B   Complex,  Folic  Acid,    Vitamin  B12    Multivitamin,       Calcium  With  magnesium  And  Vit D    Supplementations   Over  The  Counter  Discussed              *  EVALUATIONS  AND  FOLLOW UP:                   *   OPTHALMOLOGY                             * ENT                *   DERMATOLOGY                                                      *       MRI  BRAIN  WITH  AND  W/O   CONTRAST    IN   DEC.  2020                                            CHEST   X RAY     SHOWED                                                NO  SIGNIFICANT  ABNORMALITIES                           *      LABS       IN   DEC.  2020    SHOWED :                                     A)    STRONGLY   POSITIVE    HSV I  AND  HSV II    Ig G                                                            And        Ig M   Within NORMAL  LIMITS                                      B)     POSITIVE   ANTICARDIOLIPIN      Ig  G    And    IgM                                       -     RESULTS  REVIEWED   WITH PATIENT   IN  DETAIL                                *        PATIENT    INDICATED  IN  JAN. 2021       HER   TRIGEMINAL   NEURALGIA                                       SYMPTOMS       AND    MIGRAINES      ARE     80  %    BETTER                                        -ON    TOPAMAX,    NEURONTIN                                     PATIENT  ADVISED  TO  CONTINUE  THE  SAME.                             *          H/O      FRONTAL  HEADACHE     FOR  LAST  TWO  DAYS                                      WITH  DIZZINESS   . NO   FEVER  . NO  NECK PAIN                                            IMITREX   DID  NOT  HELP.                                                                  -  ACUTE  NON INTRACTABLE      TENSION  HEADACHE                                  PATIENT  WILL  BE  TRIED  WITH  FIORICET     AS  NEEDED *      PATIENT  ALSO    HAS  LONG  HISTORY OF   ALLERGIES                                     ADVISED  TO  FOLLOW   WITH  HER  PCP                                     IF   THE    HEADACHES     DID  NOT    GET  BETTER,     PATIENT                                ADVISED  TO  GO  TO   C  OR  ER. Controlled Substances Monitoring: Periodic Controlled Substance Monitoring: Possible medication side effects, risk of tolerance/dependence & alternative treatments discussed. , Assessed functional status. Cindi Simmons MD)              Orders Placed This Encounter   Medications    SUMAtriptan (IMITREX) 100 MG tablet     Sig: Take 1 tablet by mouth once as needed for Migraine     Dispense:  12 tablet     Refill:  2    topiramate (TOPAMAX) 100 MG tablet     Sig: ONE  TABLET  TWICE  DAILY     Dispense:  60 tablet     Refill:  2    butalbital-acetaminophen-caffeine (FIORICET, ESGIC) -40 MG per tablet     Sig: TAKE 1 OR 2 TABLETS BYMOUTH   2 -  3   TIMES     A DAY   AS   NEEDED     Dispense:  90 tablet     Refill:  1                 *PATIENT   TO  FOLLOW  UP  WITH   PRIMARY  CARE         OTHER  CONSULTANTS  AS  BEFORE.               *TO  FOLLOW  WITH   MENTAL  HEALTH  PROFESSIONALS ,  INCLUDING            PSYCHOLOGICAL  COUNSELING   AND  PSYCHIATRIC  EVALUTIONS,                     *  Maintain   Healthy  Life Style    With   Periodic  Monitoring  Of          Any  Medical  Conditions  Including   Elevated  Blood  Pressure,  Lipid  Profile,        Blood  Sugar levels  AndHeart  Disease. *   Period   Screening  For  Cancers  Involving  Breast,  Colon,         Lungs  And  Other  Organs  As  Applicable,  In consultation   With  Your  Primary Care Providers. *Second  Neurological  Opinion  And  Evaluations  In  [facility]  Setting  If  Patient  Is  Interested.                  * Please   Contact   Neurology  Clinic   Early   If   Are  Any healthy lifestyle also can lower your risk for serious health problems, such ashigh blood pressure, heart disease, and diabetes.  You can follow a few steps listed below to improve your health and the health of your family.  Follow-up careis a key part of your treatment and safety. Be sure to make and go to all appointments, and call your doctor if you are having problems. Its also a good idea to know your test results and keep a list of the medicines you take.  How can you care for yourself at home?  Do not eat too much sugar, fat, or fast foods. You can still have dessert and treats nowand then. The goal is moderation.  Start small to improve your eating habits. Pay attention to portion sizes, drink less juice and soda pop, and eat more fruits and vegetables.  Eat a healthy amount of food. A 3-ounce serving of meat, for example, is about the size of a deck of cards. Fill the rest of your plate with vegetables and whole grains.  Limit theamount of soda and sports drinks you have every day. Drink more water when you are thirsty.  Eat at least 5 servings of fruits and vegetables every day. It may seem like a lot, but it is not hard to reach this goal. Aserving or helping is 1 piece of fruit, 1 cup of vegetables, or 2 cups of leafy, raw vegetables. Have an apple or some carrot sticks as an afternoon snack instead of a candy bar. Try to have fruits and/or vegetables at everymeal.   Make exercise part of your daily routine. You may want to start with simple activities, such as walking, bicycling, or slow swimming. Try deirdre active 30 to 60 minutes every day. You do not need to do all 30 to 60 minutes all at once. For example, you can exercise 3 times a day for 10 or 20 minutes. Moderate exercise is safe for most people, but it is always agood idea to talk to your doctor before starting an exercise program.   Keep moving. Han Distance the lawn, work in the garden, or Microdermis.  Take the stairs instead of the elevator at work.  If you smoke, quit. Peoplewho smoke have an increased risk for heart attack, stroke, cancer, and other lung illnesses. Quitting is hard, but there are ways to boost your chance of quitting tobacco for good.  Use nicotine gum, patches, or lozenges.  Ask your doctor about stop-smoking programs and medicines.  Keep trying.  In addition to reducing your risk of diseases in the future, you will notice some benefits soon after you stop using tobacco. If you have shortness of breath or asthma symptoms, they will likely getbetter within a few weeks after you quit.  Limit how much alcohol you drink. Moderate amounts of alcohol (up to 2 drinks a day for men, 1drink a day for women) are okay. But drinking too much can lead to liver problems, high blood pressure, and other health problems.  health   If you have a family, there are many things you can do together to improve your health.  Eat meals together as a family as often as possible.  Eat healthy foods. This includes fruits, vegetables, lean meats and dairy, and whole grains.  Include your family in your fitness plan. Most peoplethink of activities such as jogging or tennis as the way to fitness, but there are many ways you and your family can be more active. Anything that makes you breathe hard and gets your heart pumping is exercise. Here are sometips:   Walk to do errands or to take your child to school or the bus.  Go for a family bike ride after dinner instead of watching TV.  Where can you learn more?  Go [email or web address] and sign in to your Out[employer] account. Enter A497 in the Search HealthInformation box to learn more about \"A Healthy Lifestyle: Care Instructions. \"     If you do not have anaccount, please click on the \"Sign Up Now\" link.  Current as of: July 26, 2016   Content Version: 11.2   © 9181-9158 [employer]. Care instructions adapted under license by [facility] ([facility]).  If you have questions about a medical condition or this instruction, always ask your healthcare professional. [employer] disclaims any warranty or liability for your use of this information.

## 2021-06-18 ENCOUNTER — TELEPHONE (OUTPATIENT)
Dept: NEUROLOGY | Age: 51
End: 2021-06-18

## 2021-06-18 NOTE — TELEPHONE ENCOUNTER
This writer contacted patient in re: missed appointment with Dr. Simon Rose today, left voicemail to contact office to reschedule.
